# Patient Record
Sex: FEMALE | Race: WHITE | HISPANIC OR LATINO | Employment: FULL TIME | URBAN - METROPOLITAN AREA
[De-identification: names, ages, dates, MRNs, and addresses within clinical notes are randomized per-mention and may not be internally consistent; named-entity substitution may affect disease eponyms.]

---

## 2017-03-06 ENCOUNTER — OFFICE VISIT (OUTPATIENT)
Dept: FAMILY MEDICINE | Facility: CLINIC | Age: 37
End: 2017-03-06
Payer: COMMERCIAL

## 2017-03-06 VITALS
OXYGEN SATURATION: 98 % | WEIGHT: 175.5 LBS | BODY MASS INDEX: 31.1 KG/M2 | HEART RATE: 81 BPM | SYSTOLIC BLOOD PRESSURE: 122 MMHG | DIASTOLIC BLOOD PRESSURE: 72 MMHG | TEMPERATURE: 98.4 F | HEIGHT: 63 IN

## 2017-03-06 DIAGNOSIS — Z00.00 ROUTINE GENERAL MEDICAL EXAMINATION AT A HEALTH CARE FACILITY: Primary | ICD-10-CM

## 2017-03-06 DIAGNOSIS — Z13.6 CARDIOVASCULAR SCREENING; LDL GOAL LESS THAN 160: ICD-10-CM

## 2017-03-06 LAB
ANION GAP SERPL CALCULATED.3IONS-SCNC: 5 MMOL/L (ref 3–14)
BUN SERPL-MCNC: 15 MG/DL (ref 7–30)
CALCIUM SERPL-MCNC: 8.4 MG/DL (ref 8.5–10.1)
CHLORIDE SERPL-SCNC: 107 MMOL/L (ref 94–109)
CHOLEST SERPL-MCNC: 142 MG/DL
CO2 SERPL-SCNC: 27 MMOL/L (ref 20–32)
CREAT SERPL-MCNC: 0.72 MG/DL (ref 0.52–1.04)
ERYTHROCYTE [DISTWIDTH] IN BLOOD BY AUTOMATED COUNT: 16 % (ref 10–15)
GFR SERPL CREATININE-BSD FRML MDRD: ABNORMAL ML/MIN/1.7M2
GLUCOSE SERPL-MCNC: 94 MG/DL (ref 70–99)
HCT VFR BLD AUTO: 34.2 % (ref 35–47)
HDLC SERPL-MCNC: 45 MG/DL
HGB BLD-MCNC: 10.9 G/DL (ref 11.7–15.7)
LDLC SERPL CALC-MCNC: 84 MG/DL
MCH RBC QN AUTO: 23.3 PG (ref 26.5–33)
MCHC RBC AUTO-ENTMCNC: 31.9 G/DL (ref 31.5–36.5)
MCV RBC AUTO: 73 FL (ref 78–100)
NONHDLC SERPL-MCNC: 97 MG/DL
PLATELET # BLD AUTO: 306 10E9/L (ref 150–450)
POTASSIUM SERPL-SCNC: 4.3 MMOL/L (ref 3.4–5.3)
RBC # BLD AUTO: 4.68 10E12/L (ref 3.8–5.2)
SODIUM SERPL-SCNC: 139 MMOL/L (ref 133–144)
TRIGL SERPL-MCNC: 66 MG/DL
WBC # BLD AUTO: 7.9 10E9/L (ref 4–11)

## 2017-03-06 PROCEDURE — 99395 PREV VISIT EST AGE 18-39: CPT | Performed by: FAMILY MEDICINE

## 2017-03-06 PROCEDURE — 80061 LIPID PANEL: CPT | Performed by: FAMILY MEDICINE

## 2017-03-06 PROCEDURE — 85027 COMPLETE CBC AUTOMATED: CPT | Performed by: FAMILY MEDICINE

## 2017-03-06 PROCEDURE — 80048 BASIC METABOLIC PNL TOTAL CA: CPT | Performed by: FAMILY MEDICINE

## 2017-03-06 PROCEDURE — 36415 COLL VENOUS BLD VENIPUNCTURE: CPT | Performed by: FAMILY MEDICINE

## 2017-03-06 NOTE — NURSING NOTE
"Chief Complaint   Patient presents with     Physical       Initial /72  Pulse 81  Temp 98.4  F (36.9  C) (Oral)  Ht 5' 3\" (1.6 m)  Wt 175 lb 8 oz (79.6 kg)  SpO2 98%  BMI 31.09 kg/m2 Estimated body mass index is 31.09 kg/(m^2) as calculated from the following:    Height as of this encounter: 5' 3\" (1.6 m).    Weight as of this encounter: 175 lb 8 oz (79.6 kg).  Medication Reconciliation: complete   Nadege Taylor Medical Assistant      "

## 2017-03-06 NOTE — MR AVS SNAPSHOT
After Visit Summary   3/6/2017    Keiko Farah    MRN: [id number]           Patient Information     Date Of Birth          1980        Visit Information        Provider Department      3/6/2017 9:00 AM Weiler, Karen, MD [facility] [place]        Today's Diagnoses     Routine general medical examination at a health care facility    -  1    CARDIOVASCULAR SCREENING; LDL GOAL LESS THAN 160          Care Instructions      Preventive Health Recommendations  Female Ages 26 - 39  Yearly exam:   See your health care provider every year in order to    Review health changes.     Discuss preventive care.      Review your medicines if you your doctor has prescribed any.    Until age 30: Get a Pap test every three years (more often if you have had an abnormal result).    After age 30: Talk to your doctor about whether you should have a Pap test every 3 years or have a Pap test with HPV screening every 5 years.   You do not need a Pap test if your uterus was removed (hysterectomy) and you have not had cancer.  You should be tested each year for STDs (sexually transmitted diseases), if you're at risk.   Talk to your provider about how often to have your cholesterol checked.  If you are at risk for diabetes, you should have a diabetes test (fasting glucose).  Shots: Get a flu shot each year. Get a tetanus shot every 10 years.   Nutrition:     Eat at least 5 servings of fruits and vegetables each day.    Eat whole-grain bread, whole-wheat pasta and brown rice instead of white grains and rice.    Talk to your provider about Calcium and Vitamin D.     Lifestyle    Exercise at least 150 minutes a week (30 minutes a day, 5 days of the week). This will help you control your weight and prevent disease.    Limit alcohol to one drink per day.    No smoking.     Wear sunscreen to prevent skin cancer.    See your dentist every six months for an exam and cleaning.          Follow-ups after your visit        Who to  "contact     If you have questions or need follow up information about today's clinic visit or your schedule please contact [facility] [place] directly at [phone].  Normal or non-critical lab and imaging results will be communicated to you by MyChart, letter or phone within 4 business days after the clinic has received the results. If you do not hear from us within 7 days, please contact the clinic through MyChart or phone. If you have a critical or abnormal lab result, we will notify you by phone as soon as possible.  Submit refill requests through [employer] or call your pharmacy and they will forward the refill request to us. Please allow 3 business days for your refill to be completed.          Additional Information About Your Visit        [employer] Information     [employer] gives you secure access to your electronic health record. If you see a primary care provider, you can also send messages to your care team and make appointments. If you have questions, please call your primary care clinic.  If you do not have a primary care provider, please call [phone] and they will assist you.        Care EveryWhere ID     This is your Care EveryWhere ID. This could be used by other organizations to access your [facility] medical records  [id number]        Your Vitals Were     Pulse Temperature Height Pulse Oximetry BMI (Body Mass Index)       81 98.4  F (36.9  C) (Oral) 5' 3\" (1.6 m) 98% 31.09 kg/m2        Blood Pressure from Last 3 Encounters:   03/06/17 122/72   02/08/16 98/72   11/25/15 114/76    Weight from Last 3 Encounters:   03/06/17 175 lb 8 oz (79.6 kg)   02/08/16 175 lb (79.4 kg)   11/25/15 175 lb (79.4 kg)              We Performed the Following     Basic metabolic panel     CBC with platelets     JUST IN CASE     Lipid panel reflex to direct LDL        Primary Care Provider Office Phone # Fax #    Karen Weiler, [phone]       [facility] [address]  [place] " [address]        Thank you!     Thank you for choosing [facility] [place]  for your care. Our goal is always to provide you with excellent care. Hearing back from our patients is one way we can continue to improve our services. Please take a few minutes to complete the written survey that you may receive in the mail after your visit with us. Thank you!             Your Updated Medication List - Protect others around you: Learn how to safely use, store and throw away your medicines at www.disposemymeds.org.          This list is accurate as of: 3/6/17 10:09 AM.  Always use your most recent med list.                   Brand Name Dispense Instructions for use    DRYSOL EX          MULTI-VITAMIN DAILY PO          UNKNOWN MED DOSAGE          valACYclovir 500 MG tablet    VALTREX    14 tablet    Take 1 tablet (500 mg) by mouth 2 times daily as needed
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
## 2017-05-09 ENCOUNTER — OFFICE VISIT (OUTPATIENT)
Dept: FAMILY MEDICINE | Facility: CLINIC | Age: 37
End: 2017-05-09
Payer: COMMERCIAL

## 2017-05-09 VITALS
HEART RATE: 81 BPM | DIASTOLIC BLOOD PRESSURE: 72 MMHG | BODY MASS INDEX: 31.01 KG/M2 | WEIGHT: 175 LBS | SYSTOLIC BLOOD PRESSURE: 122 MMHG | HEIGHT: 63 IN | OXYGEN SATURATION: 98 % | TEMPERATURE: 98.4 F

## 2017-05-09 DIAGNOSIS — F43.9 STRESS: Primary | ICD-10-CM

## 2017-05-09 PROCEDURE — 99213 OFFICE O/P EST LOW 20 MIN: CPT | Performed by: FAMILY MEDICINE

## 2017-05-09 NOTE — PROGRESS NOTES
SUBJECTIVE:                                                    Keiko Farah is a 37 year old female who presents to clinic today for the following health issues:      Pt is here to discuss FMLA.  She has 4 kids living at home with her, each of whom has a variety of chronic medical problems. Her son John has developed a food allergies as well as some recurrent warts on his nose. He is typically seen twice a month to care for these warts by a skin specialist. Her son Mario has been diagnosed with ADHD and is having increasingly violent outbursts at home. His behavior is beginning to affect multiple family members. Her foster child Jeet continues to have recurrent academic failures. Her daughter Brandi is also having some learning disabilities and needs to be evaluated for this further. All of these have combined to create a significant amount of stress for Keiko.  She is finding it difficult to focus at work and that she spending a significant amount of time away from her job as a  to attend the medical needs of her children. Her supervisor advised her to complete a FMLA leave to protect her employment.    She estimates she goes to the doctor for her kids about 4 times per month and typically these episodes take about 4 hours to complete ROM picking up her child at school or home, transporting them to the physician office for evaluation, and then transporting them back. She estimates that at least 6 months will be required to stabilize each child's chronic medical condition with reevaluation being needed at 6 months.         Problem list and histories reviewed & adjusted, as indicated.  Additional history: as documented        Reviewed and updated as needed this visit by clinical staff       Reviewed and updated as needed this visit by Provider         ROS:  Constitutional, HEENT, cardiovascular, pulmonary, gi and gu systems are negative, except as otherwise noted.  Does note that this  "is putting some stress on her. Also notes her marriage feels a little bit strained as she is concerned about the alcohol consumption habits of her  Roberto who has a stressful job as an .    OBJECTIVE:                                                    /72  Pulse 81  Temp 98.4  F (36.9  C) (Tympanic)  Ht 5' 3\" (1.6 m)  Wt 175 lb (79.4 kg)  SpO2 98%  BMI 31 kg/m2  Body mass index is 31 kg/(m^2).  GENERAL: healthy, alert and no distress  PSYCH: mentation appears normal and tearful at times    Diagnostic Test Results:  none      ASSESSMENT/PLAN:                                                            1. Stress  Clearly she is under a significant amount of stress. I support the [facility] process for her in caring for her children with chronic medical problems. I will await receipt of the [facility] paperwork from her employer and fill it out as noted above.    Fifteen minutes were spent with the patient today, greater than 50% in face to face counseling.       See Patient Instructions    Eleazar Parry Jr, MD  [facility]  "

## 2017-05-09 NOTE — NURSING NOTE
"Chief Complaint   Patient presents with     Forms       Initial /72  Pulse 81  Temp 98.4  F (36.9  C) (Tympanic)  Ht 5' 3\" (1.6 m)  Wt 175 lb (79.4 kg)  SpO2 98%  BMI 31 kg/m2 Estimated body mass index is 31 kg/(m^2) as calculated from the following:    Height as of this encounter: 5' 3\" (1.6 m).    Weight as of this encounter: 175 lb (79.4 kg).  Medication Reconciliation: complete  "

## 2017-05-09 NOTE — MR AVS SNAPSHOT
"              After Visit Summary   5/9/2017    Keiko Farah    MRN: [id number]           Patient Information     Date Of Birth          1980        Visit Information        Provider Department      5/9/2017 10:00 AM Eleazar Parry Jr., MD [facility]        Today's Diagnoses     Stress    -  1       Follow-ups after your visit        Who to contact     If you have questions or need follow up information about today's clinic visit or your schedule please contact [facility] directly at [phone].  Normal or non-critical lab and imaging results will be communicated to you by MyChart, letter or phone within 4 business days after the clinic has received the results. If you do not hear from us within 7 days, please contact the clinic through [employer] or phone. If you have a critical or abnormal lab result, we will notify you by phone as soon as possible.  Submit refill requests through [employer] or call your pharmacy and they will forward the refill request to us. Please allow 3 business days for your refill to be completed.          Additional Information About Your Visit        MyChart Information     [employer] gives you secure access to your electronic health record. If you see a primary care provider, you can also send messages to your care team and make appointments. If you have questions, please call your primary care clinic.  If you do not have a primary care provider, please call [phone] and they will assist you.        Care EveryWhere ID     This is your Care EveryWhere ID. This could be used by other organizations to access your [facility] medical records  [id number]        Your Vitals Were     Pulse Temperature Height Pulse Oximetry BMI (Body Mass Index)       81 98.4  F (36.9  C) (Tympanic) 5' 3\" (1.6 m) 98% 31 kg/m2        Blood Pressure from Last 3 Encounters:   05/09/17 122/72   03/06/17 122/72   02/08/16 98/72    Weight from Last 3 Encounters:   05/09/17 175 lb (79.4 " kg)   03/06/17 175 lb 8 oz (79.6 kg)   02/08/16 175 lb (79.4 kg)              Today, you had the following     No orders found for display       Primary Care Provider Office Phone # Fax #    Karen Weiler, [phone]       [facility] [address]  [place] [address]        Thank you!     Thank you for choosing [facility]  for your care. Our goal is always to provide you with excellent care. Hearing back from our patients is one way we can continue to improve our services. Please take a few minutes to complete the written survey that you may receive in the mail after your visit with us. Thank you!             Your Updated Medication List - Protect others around you: Learn how to safely use, store and throw away your medicines at www.disposemymeds.org.          This list is accurate as of: 5/9/17  1:27 PM.  Always use your most recent med list.                   Brand Name Dispense Instructions for use    DRYSOL EX          MULTI-VITAMIN DAILY PO          UNKNOWN MED DOSAGE          valACYclovir 500 MG tablet    VALTREX    14 tablet    Take 1 tablet (500 mg) by mouth 2 times daily as needed

## 2017-05-09 NOTE — LETTER
[facility]  [address] [address] [phone]          May 9, 2017    RE:  Keiko Farah                                                                                                                                                       [address] [address]            To whom it may concern:    Keiko Farah is under my professional care for family related stressors.  She  may return to work with the following: The employee is ABLE to return to work today.        Sincerely,        Eleazar Parry Jr, MD

## 2017-06-28 ENCOUNTER — OFFICE VISIT (OUTPATIENT)
Dept: FAMILY MEDICINE | Facility: CLINIC | Age: 37
End: 2017-06-28
Payer: COMMERCIAL

## 2017-06-28 VITALS
HEIGHT: 63 IN | TEMPERATURE: 98.4 F | SYSTOLIC BLOOD PRESSURE: 114 MMHG | HEART RATE: 83 BPM | BODY MASS INDEX: 31.01 KG/M2 | WEIGHT: 175 LBS | OXYGEN SATURATION: 98 % | DIASTOLIC BLOOD PRESSURE: 70 MMHG

## 2017-06-28 DIAGNOSIS — H92.01 OTALGIA OF RIGHT EAR: Primary | ICD-10-CM

## 2017-06-28 PROCEDURE — 99213 OFFICE O/P EST LOW 20 MIN: CPT | Performed by: FAMILY MEDICINE

## 2017-06-28 NOTE — PROGRESS NOTES
"  SUBJECTIVE:                                                    Keiko Farah is a 37 year old female who presents to clinic today for the following health issues:      Acute Illness   Acute illness concerns:  Ear issue   Onset: 4 days    Fever: no    Chills/Sweats: no    Headache (location?): no    Sinus Pressure:YES    Conjunctivitis:  no    Ear Pain: YES- rt feels full    Rhinorrhea: YES    Congestion: YES    Sore Throat: YES- last wk     Cough: no    Wheeze: no    Decreased Appetite: no    Nausea: no    Vomiting: no    Diarrhea:  no    Dysuria/Freq.: no    Fatigue/Achiness: no    Sick/Strep Exposure: no     Therapies Tried and outcome:  peroxide           Problem list and histories reviewed & adjusted, as indicated.  Additional history: as documented        Reviewed and updated as needed this visit by clinical staff  Allergies  Meds  Med Hx       Reviewed and updated as needed this visit by Provider         ROS:  Constitutional, HEENT, cardiovascular, pulmonary, gi and gu systems are negative, except as otherwise noted.    OBJECTIVE:     /70  Pulse 83  Temp 98.4  F (36.9  C) (Oral)  Ht 5' 3\" (1.6 m)  Wt 175 lb (79.4 kg)  SpO2 98%  BMI 31 kg/m2  Body mass index is 31 kg/(m^2).  GENERAL: healthy, alert and no distress  EYES: Eyes grossly normal to inspection, PERRL and conjunctivae and sclerae normal  HENT: ear canals and TM's normal, nose and mouth without ulcers or lesions  NECK: no adenopathy, no asymmetry, masses, or scars and thyroid normal to palpation  RESP: lungs clear to auscultation - no rales, rhonchi or wheezes  CV: regular rate and rhythm, normal S1 S2, no S3 or S4, no murmur, click or rub, no peripheral edema and peripheral pulses strong  MS: no gross musculoskeletal defects noted, no edema  PSYCH: mentation appears normal, affect normal/bright    Diagnostic Test Results:  none     ASSESSMENT/PLAN:             1. Otalgia of right ear  Likely due to eustachian tube dysfunction.  " Encouraged Valsalva maneuver, Sudafed.  Return to clinic in 10-14 days if not improving, sooner if worsens.       See Patient Instructions    Eleazar Parry Jr, MD  [facility]

## 2017-06-28 NOTE — PATIENT INSTRUCTIONS
Endometrial ablation  Dr. Darin Sinha at [facility]  ([facility])

## 2017-06-28 NOTE — MR AVS SNAPSHOT
After Visit Summary   6/28/2017    Keiko Farah    MRN: [id number]           Patient Information     Date Of Birth          1980        Visit Information        Provider Department      6/28/2017 2:20 PM Eleazar Parry Jr., MD [facility]        Today's Diagnoses     Otalgia of right ear    -  1      Care Instructions    Endometrial ablation  Dr. Darin Sinha at [facility]  ([facility])          Follow-ups after your visit        Follow-up notes from your care team     Return if symptoms worsen or fail to improve.      Who to contact     If you have questions or need follow up information about today's clinic visit or your schedule please contact [facility] [place] directly at [phone].  Normal or non-critical lab and imaging results will be communicated to you by [employer], letter or phone within 4 business days after the clinic has received the results. If you do not hear from us within 7 days, please contact the clinic through [employer] or phone. If you have a critical or abnormal lab result, we will notify you by phone as soon as possible.  Submit refill requests through [employer] or call your pharmacy and they will forward the refill request to us. Please allow 3 business days for your refill to be completed.          Additional Information About Your Visit        MyChart Information     [employer] gives you secure access to your electronic health record. If you see a primary care provider, you can also send messages to your care team and make appointments. If you have questions, please call your primary care clinic.  If you do not have a primary care provider, please call [phone] and they will assist you.        Care EveryWhere ID     This is your Care EveryWhere ID. This could be used by other organizations to access your [facility] medical records  [id number]        Your Vitals Were     Pulse Temperature Height Pulse Oximetry BMI (Body Mass  "Index)       83 98.4  F (36.9  C) (Oral) 5' 3\" (1.6 m) 98% 31 kg/m2        Blood Pressure from Last 3 Encounters:   06/28/17 114/70   05/09/17 122/72   03/06/17 122/72    Weight from Last 3 Encounters:   06/28/17 175 lb (79.4 kg)   05/09/17 175 lb (79.4 kg)   03/06/17 175 lb 8 oz (79.6 kg)              Today, you had the following     No orders found for display       Primary Care Provider Office Phone # Fax #    Karen Weiler, [phone]       [facility] [address]  [place] [address]        Equal Access to Services     TOM HERNANDEZ : Hadii aad ku hadasho Sonavjotali, waaxda luqadaha, qaybta kaalmada adeegyada, waxay vijayin verónica hall. So [facility] [phone].    ATENCIÓN: Si habla español, tiene a oakley disposición servicios gratuitos de asistencia lingüística. Llame al [phone].    We comply with applicable federal civil rights laws and [place] laws. We do not discriminate on the basis of race, color, national origin, age, disability sex, sexual orientation or gender identity.            Thank you!     Thank you for choosing [facility]  for your care. Our goal is always to provide you with excellent care. Hearing back from our patients is one way we can continue to improve our services. Please take a few minutes to complete the written survey that you may receive in the mail after your visit with us. Thank you!             Your Updated Medication List - Protect others around you: Learn how to safely use, store and throw away your medicines at www.disposemymeds.org.          This list is accurate as of: 6/28/17  2:51 PM.  Always use your most recent med list.                   Brand Name Dispense Instructions for use Diagnosis    DRYSOL EX           MULTI-VITAMIN DAILY PO           UNKNOWN MED DOSAGE           valACYclovir 500 MG tablet    VALTREX    14 tablet    Take 1 tablet (500 mg) by mouth 2 times daily as needed    Recurrent cold sores         "

## 2017-06-28 NOTE — NURSING NOTE
"Chief Complaint   Patient presents with     Ear Problem       Initial /70  Pulse 83  Temp 98.4  F (36.9  C) (Oral)  Ht 5' 3\" (1.6 m)  Wt 175 lb (79.4 kg)  SpO2 98%  BMI 31 kg/m2 Estimated body mass index is 31 kg/(m^2) as calculated from the following:    Height as of this encounter: 5' 3\" (1.6 m).    Weight as of this encounter: 175 lb (79.4 kg).  Medication Reconciliation: complete  "

## 2018-02-13 ENCOUNTER — OFFICE VISIT (OUTPATIENT)
Dept: OBGYN | Facility: CLINIC | Age: 38
End: 2018-02-13
Payer: COMMERCIAL

## 2018-02-13 VITALS
BODY MASS INDEX: 32.78 KG/M2 | DIASTOLIC BLOOD PRESSURE: 72 MMHG | HEART RATE: 95 BPM | SYSTOLIC BLOOD PRESSURE: 116 MMHG | HEIGHT: 63 IN | WEIGHT: 185 LBS

## 2018-02-13 DIAGNOSIS — Z11.51 SCREENING FOR HUMAN PAPILLOMAVIRUS: ICD-10-CM

## 2018-02-13 DIAGNOSIS — Z01.419 WOMEN'S ANNUAL ROUTINE GYNECOLOGICAL EXAMINATION: Primary | ICD-10-CM

## 2018-02-13 DIAGNOSIS — N92.0 MENORRHAGIA WITH REGULAR CYCLE: ICD-10-CM

## 2018-02-13 DIAGNOSIS — R53.83 FATIGUE, UNSPECIFIED TYPE: ICD-10-CM

## 2018-02-13 DIAGNOSIS — Z12.4 PAP SMEAR FOR CERVICAL CANCER SCREENING: ICD-10-CM

## 2018-02-13 LAB
DEPRECATED CALCIDIOL+CALCIFEROL SERPL-MC: 16 UG/L (ref 20–75)
ERYTHROCYTE [DISTWIDTH] IN BLOOD BY AUTOMATED COUNT: 16.5 % (ref 10–15)
HCT VFR BLD AUTO: 34.6 % (ref 35–47)
HGB BLD-MCNC: 10.8 G/DL (ref 11.7–15.7)
MCH RBC QN AUTO: 22.5 PG (ref 26.5–33)
MCHC RBC AUTO-ENTMCNC: 31.2 G/DL (ref 31.5–36.5)
MCV RBC AUTO: 72 FL (ref 78–100)
PLATELET # BLD AUTO: 346 10E9/L (ref 150–450)
RBC # BLD AUTO: 4.81 10E12/L (ref 3.8–5.2)
TSH SERPL DL<=0.005 MIU/L-ACNC: 0.81 MU/L (ref 0.4–4)
WBC # BLD AUTO: 7.8 10E9/L (ref 4–11)

## 2018-02-13 PROCEDURE — 99385 PREV VISIT NEW AGE 18-39: CPT | Performed by: ADVANCED PRACTICE MIDWIFE

## 2018-02-13 PROCEDURE — 36415 COLL VENOUS BLD VENIPUNCTURE: CPT | Performed by: ADVANCED PRACTICE MIDWIFE

## 2018-02-13 PROCEDURE — 85027 COMPLETE CBC AUTOMATED: CPT | Performed by: ADVANCED PRACTICE MIDWIFE

## 2018-02-13 PROCEDURE — 82306 VITAMIN D 25 HYDROXY: CPT | Performed by: ADVANCED PRACTICE MIDWIFE

## 2018-02-13 PROCEDURE — G0145 SCR C/V CYTO,THINLAYER,RESCR: HCPCS | Performed by: ADVANCED PRACTICE MIDWIFE

## 2018-02-13 PROCEDURE — 87624 HPV HI-RISK TYP POOLED RSLT: CPT | Performed by: ADVANCED PRACTICE MIDWIFE

## 2018-02-13 PROCEDURE — 84443 ASSAY THYROID STIM HORMONE: CPT | Performed by: ADVANCED PRACTICE MIDWIFE

## 2018-02-13 RX ORDER — DESOGESTREL AND ETHINYL ESTRADIOL 0.15-0.03
1 KIT ORAL DAILY
Qty: 28 TABLET | Refills: 2 | Status: SHIPPED | OUTPATIENT
Start: 2018-02-13 | End: 2018-06-18
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
## 2018-02-13 NOTE — PATIENT INSTRUCTIONS
Birth Control Pills    Combination birth control pills contain both estrogen and progestin.  There are numerous brands of birth control pills otherwise known as oral contraceptive pills (OCP's).  Each brand has a different combination of estrogen and progestin so every woman can find the one that is right for her.  OCP's are a safe and effective way to prevent pregnancy in most women.    How do OCP's work  OCP's work by several different mechanisms.  They cause changes in the cervix and the lining of the uterus.  The cervical mucus becomes thicker which will prevent the sperm from entering the cervix.  The lining of the uterus becomes thin which helps prevent an egg from attaching to it.  In combination, these events make it unlikely that you will get pregnant. It may also prevent ovulation completely.    Benefits of OCP's  May reduce your risk of:  Cancer of the uterus and ovary, ovarian cysts, pelvic infection, bone loss, benign breast disease, anemia, ectopic pregnancy and acne.  It may also decrease symptoms of PCOS (Polycystic Ovarian Syndrome). OCP's may also improve cramping during menstrual cycle and may make you cycle shorter and lighter.    How to take OCP's  You have several choices on how to start taking your OCP's:    You can start the pill on the first day of your next period    You can start the pill on the Sunday after your next period starts    You can start the pill on the first day it was prescribed no matter where you are in your cycle.  In this case, you will need to make sure you are not pregnant.    No matter when you start your first pack, you will always start your next pack on the same day you started your first pack.    You should take the pill at the same time every day.  Do not skip any pills.  If you miss any pills, are taking antibiotics or vomit, use a backup method of birth control until you get your next period.    Pills come in packs of 21, 28 or 91 pills:      21 Pills:  Take one  pill at the same time every day for 21 days.  Wait 7 days before beginning your next pack.  During these 7 days you will have your period.    28 Pills:  Take one pill at the same time every day for 28 days.  The last 7 pills in the pack do not contain estrogen/progestin.  During these 7 days you will have your period.      91 Pills:  Take one pill at the same time every day for 91 days.  The last 7 pills in the pack do not contain estrogen/progestin.  During these 7 days you will have your period.  With this method you will only have 4 periods a year.  Some women eventually have no bleeding at all.    Each pill pack comes with instructions.  Please make sure you read them and understand these instructions.      What to do if you miss a pill    Occasionally you may forget to take a pill or not take it on time.  Take the missed pill as soon as you remember.  Take the next pill at the regular time.  It is ok if you take two pills in one day.  You may feel a bit queasy or have some spotting, this is normal and should not be concerning.  If you have missed more than one pill use a back up method of birth control and call the clinic for instructions on how to proceed.    Who should not take Combined OCP's    If you have a history or have blood clots    A history of cerebral vascular accident (stroke)    If you have ischemic heart or coronary artery disease    Known of suspected breast cancer    Known or suspected pregnancy    Smoker and over age 35    Any know liver abnormality    Migraine headaches with an aura    Undiagnosed abnormal vaginal bleeding    High blood pressure    Common side effects when starting OCP's  Headache, nausea, dizziness, breakthrough bleeding, missed periods, tender breasts, depression and anxiety.  Most side effects are minor and resolve in the first few months. Take the pill with meals or at bedtime if nausea occurs.    Call or return for care in the following circumstances:      Unexpected  missed periods or very heavy bleeding    Persistent vaginal bleeding    Depression    Suspected pregnancy    Persistent side effects such as:  Nausea, irregular menses or mood changes.    Seek emergency care immediately for the following:  ACHES    Abdominal or pelvic pain    Chest pain    Severe headache     Visual disturbances    Severe leg pain or numbness or tingling of extremities    Lastly-    Use of a backup method is recommended for the first cycle    Condoms are recommended to protect against STI's    OCP's are 99% effective if take correctly.    The pill helps to keep your periods regular, lighter and shorter and reduces cramps.  If you desire a pregnancy, you may stop taking your OCPs.     Please call the clinic with questions and concerns  [facility]  [phone]    Preventive Health Recommendations  Female Ages 21 - 39  Yearly exam:   See your health care provider every year in order to  1. Review health changes.  2. Discuss preventive care.    3. Review your medicines if you your provider has prescribed any.      You do not need a Pap test if your uterus was removed (hysterectomy) and you have not had cancer.    You should be tested each year for STIs (sexually transmitted diseases) if you're at risk.     Talk to your provider about how often to have your cholesterol checked, about every 5 years if normal.    If you are at risk for diabetes, you should have a diabetes test (fasting glucose).    Vitamin D deficiency screening and thyroid disease screening is also recommended every 3-5 years depending on risk factors or more often if symptomatic  PAP Smear:   Until age 30: Get a Pap test every three years (more often if you have had an abnormal result)    After age 30: Talk to your provider about whether you should have a Pap test every 3 years or have a Pap test with HPV screening every 5 years.   Shots: Get a flu shot each year. Get a tetanus shot every 10 years.   Nutrition:     Eat at least 5  servings of fruits and vegetables each day    Eat REAL food, stay away from processed foods.    Eat whole-grain bread, whole-wheat pasta and brown rice instead of white grains and rice.    For bone health:  Eat calcium-rich foods or take calcium pills (500 to 600 mg) twice a day with food (1200 mg per day). Also take vitamin D (2000 IUs) each day.     Lifestyle    Exercise regularly (30 minutes a day, 5 days of the week). This will help you control your weight and prevent disease.    Weight bearing exercise such as weight lifting, walking, running and yoga will be beneficial later in life preventing osteoporosis     Limit alcohol to one drink per day.    No smoking.     Wear sunscreen to prevent skin cancer.    See your dentist every six months to one year for an exam and cleaning depending on their recommendation    Get an eye exam every two years or more often if you wear glasses or contacts.

## 2018-02-13 NOTE — MR AVS SNAPSHOT
After Visit Summary   2/13/2018    Keiko Farah    MRN: [id number]           Patient Information     Date Of Birth          1980        Visit Information        Provider Department      2/13/2018 9:30 AM Jihan James CNM [facility] [place]        Today's Diagnoses     Women's annual routine gynecological examination    -  1    Pap smear for cervical cancer screening        Screening for human papillomavirus        Fatigue, unspecified type        Menorrhagia with regular cycle          Care Instructions    Birth Control Pills    Combination birth control pills contain both estrogen and progestin.  There are numerous brands of birth control pills otherwise known as oral contraceptive pills (OCP's).  Each brand has a different combination of estrogen and progestin so every woman can find the one that is right for her.  OCP's are a safe and effective way to prevent pregnancy in most women.    How do OCP's work  OCP's work by several different mechanisms.  They cause changes in the cervix and the lining of the uterus.  The cervical mucus becomes thicker which will prevent the sperm from entering the cervix.  The lining of the uterus becomes thin which helps prevent an egg from attaching to it.  In combination, these events make it unlikely that you will get pregnant. It may also prevent ovulation completely.    Benefits of OCP's  May reduce your risk of:  Cancer of the uterus and ovary, ovarian cysts, pelvic infection, bone loss, benign breast disease, anemia, ectopic pregnancy and acne.  It may also decrease symptoms of PCOS (Polycystic Ovarian Syndrome). OCP's may also improve cramping during menstrual cycle and may make you cycle shorter and lighter.    How to take OCP's  You have several choices on how to start taking your OCP's:    You can start the pill on the first day of your next period    You can start the pill on the Sunday after your next period starts    You can start the pill  on the first day it was prescribed no matter where you are in your cycle.  In this case, you will need to make sure you are not pregnant.    No matter when you start your first pack, you will always start your next pack on the same day you started your first pack.    You should take the pill at the same time every day.  Do not skip any pills.  If you miss any pills, are taking antibiotics or vomit, use a backup method of birth control until you get your next period.    Pills come in packs of 21, 28 or 91 pills:      21 Pills:  Take one pill at the same time every day for 21 days.  Wait 7 days before beginning your next pack.  During these 7 days you will have your period.    28 Pills:  Take one pill at the same time every day for 28 days.  The last 7 pills in the pack do not contain estrogen/progestin.  During these 7 days you will have your period.      91 Pills:  Take one pill at the same time every day for 91 days.  The last 7 pills in the pack do not contain estrogen/progestin.  During these 7 days you will have your period.  With this method you will only have 4 periods a year.  Some women eventually have no bleeding at all.    Each pill pack comes with instructions.  Please make sure you read them and understand these instructions.      What to do if you miss a pill    Occasionally you may forget to take a pill or not take it on time.  Take the missed pill as soon as you remember.  Take the next pill at the regular time.  It is ok if you take two pills in one day.  You may feel a bit queasy or have some spotting, this is normal and should not be concerning.  If you have missed more than one pill use a back up method of birth control and call the clinic for instructions on how to proceed.    Who should not take Combined OCP's    If you have a history or have blood clots    A history of cerebral vascular accident (stroke)    If you have ischemic heart or coronary artery disease    Known of suspected breast  cancer    Known or suspected pregnancy    Smoker and over age 35    Any know liver abnormality    Migraine headaches with an aura    Undiagnosed abnormal vaginal bleeding    High blood pressure    Common side effects when starting OCP's  Headache, nausea, dizziness, breakthrough bleeding, missed periods, tender breasts, depression and anxiety.  Most side effects are minor and resolve in the first few months. Take the pill with meals or at bedtime if nausea occurs.    Call or return for care in the following circumstances:      Unexpected missed periods or very heavy bleeding    Persistent vaginal bleeding    Depression    Suspected pregnancy    Persistent side effects such as:  Nausea, irregular menses or mood changes.    Seek emergency care immediately for the following:  ACHES    Abdominal or pelvic pain    Chest pain    Severe headache     Visual disturbances    Severe leg pain or numbness or tingling of extremities    Lastly-    Use of a backup method is recommended for the first cycle    Condoms are recommended to protect against STI's    OCP's are 99% effective if take correctly.    The pill helps to keep your periods regular, lighter and shorter and reduces cramps.  If you desire a pregnancy, you may stop taking your OCPs.     Please call the clinic with questions and concerns  [facility]  [phone]    Preventive Health Recommendations  Female Ages 21 - 39  Yearly exam:   See your health care provider every year in order to  1. Review health changes.  2. Discuss preventive care.    3. Review your medicines if you your provider has prescribed any.      You do not need a Pap test if your uterus was removed (hysterectomy) and you have not had cancer.    You should be tested each year for STIs (sexually transmitted diseases) if you're at risk.     Talk to your provider about how often to have your cholesterol checked, about every 5 years if normal.    If you are at risk for diabetes, you should have a  diabetes test (fasting glucose).    Vitamin D deficiency screening and thyroid disease screening is also recommended every 3-5 years depending on risk factors or more often if symptomatic  PAP Smear:   Until age 30: Get a Pap test every three years (more often if you have had an abnormal result)    After age 30: Talk to your provider about whether you should have a Pap test every 3 years or have a Pap test with HPV screening every 5 years.   Shots: Get a flu shot each year. Get a tetanus shot every 10 years.   Nutrition:     Eat at least 5 servings of fruits and vegetables each day    Eat REAL food, stay away from processed foods.    Eat whole-grain bread, whole-wheat pasta and brown rice instead of white grains and rice.    For bone health:  Eat calcium-rich foods or take calcium pills (500 to 600 mg) twice a day with food (1200 mg per day). Also take vitamin D (2000 IUs) each day.     Lifestyle    Exercise regularly (30 minutes a day, 5 days of the week). This will help you control your weight and prevent disease.    Weight bearing exercise such as weight lifting, walking, running and yoga will be beneficial later in life preventing osteoporosis     Limit alcohol to one drink per day.    No smoking.     Wear sunscreen to prevent skin cancer.    See your dentist every six months to one year for an exam and cleaning depending on their recommendation    Get an eye exam every two years or more often if you wear glasses or contacts.                Follow-ups after your visit        Follow-up notes from your care team     Return in about 3 months (around 5/13/2018) for follow up on birth control pills.      Who to contact     If you have questions or need follow up information about today's clinic visit or your schedule please contact [facility] [place] directly at [phone].  Normal or non-critical lab and imaging results will be communicated to you by MyChart, letter or phone within 4 business days after the  "clinic has received the results. If you do not hear from us within 7 days, please contact the clinic through [employer] or phone. If you have a critical or abnormal lab result, we will notify you by phone as soon as possible.  Submit refill requests through [employer] or call your pharmacy and they will forward the refill request to us. Please allow 3 business days for your refill to be completed.          Additional Information About Your Visit        [employer] Information     [employer] gives you secure access to your electronic health record. If you see a primary care provider, you can also send messages to your care team and make appointments. If you have questions, please call your primary care clinic.  If you do not have a primary care provider, please call [phone] and they will assist you.        Care EveryWhere ID     This is your Care EveryWhere ID. This could be used by other organizations to access your [facility] medical records  [id number]        Your Vitals Were     Pulse Height Last Period Breastfeeding? BMI (Body Mass Index)       95 5' 3\" (1.6 m) 02/13/2018 (Exact Date) No 32.77 kg/m2        Blood Pressure from Last 3 Encounters:   02/13/18 116/72   06/28/17 114/70   05/09/17 122/72    Weight from Last 3 Encounters:   02/13/18 185 lb (83.9 kg)   06/28/17 175 lb (79.4 kg)   05/09/17 175 lb (79.4 kg)              We Performed the Following     CBC with platelets     HPV High Risk Types DNA Cervical     PAP imaged thin layer screen     TSH with free T4 reflex     Vitamin D Deficiency          Today's Medication Changes          These changes are accurate as of 2/13/18 10:37 AM.  If you have any questions, ask your nurse or doctor.               Start taking these medicines.        Dose/Directions    desogestrel-ethinyl estradiol 0.15-30 MG-MCG per tablet   Commonly known as:  APRI   Used for:  Menorrhagia with regular cycle   Started by:  Jihan James CNM        Dose:  1 tablet   Take 1 tablet by mouth " daily   Quantity:  28 tablet   Refills:  2            Where to get your medicines      These medications were sent to [facility] [address] [facility] [place], [place] - [address]  [address] [facility] [place] [address]     Phone:  [phone]     desogestrel-ethinyl estradiol 0.15-30 MG-MCG per tablet                Primary Care Provider Office Phone # Fax #    [facility] [phone] [address]  [facility] [place] [address]        Equal Access to Services     : Hadii aad ku hadasho Soomaali, waaxda luqadaha, qaybta kaalmada adeegyada, waxay vijayin hayollie gordillo . So [facility] [phone].    ATENCIÓN: Si habla español, tiene a oakley disposición servicios gratuitos de asistencia lingüística. [facility] [phone].    We comply with applicable federal civil rights laws and [place] laws. We do not discriminate on the basis of race, color, national origin, age, disability, sex, sexual orientation, or gender identity.            Thank you!     Thank you for choosing [facility]  for your care. Our goal is always to provide you with excellent care. Hearing back from our patients is one way we can continue to improve our services. Please take a few minutes to complete the written survey that you may receive in the mail after your visit with us. Thank you!             Your Updated Medication List - Protect others around you: Learn how to safely use, store and throw away your medicines at www.disposemymeds.org.          This list is accurate as of 2/13/18 10:37 AM.  Always use your most recent med list.                   Brand Name Dispense Instructions for use Diagnosis    desogestrel-ethinyl estradiol 0.15-30 MG-MCG per tablet    APRI    28 tablet    Take 1 tablet by mouth daily    Menorrhagia with regular cycle       DRYSOL EX           MULTI-VITAMIN DAILY PO           UNKNOWN MED DOSAGE           valACYclovir 500 MG tablet    VALTREX    14 tablet    Take 1 tablet  (500 mg) by mouth 2 times daily as needed    Recurrent cold sores

## 2018-02-13 NOTE — NURSING NOTE
"Chief Complaint   Patient presents with     Physical     Concerned about iron level, hx of anemia.  Frequent fatigue, increased weight.    Initial /72  Pulse 95  Ht 5' 3\" (1.6 m)  Wt 185 lb (83.9 kg)  LMP 02/13/2018 (Exact Date)  Breastfeeding? No  BMI 32.77 kg/m2 Estimated body mass index is 32.77 kg/(m^2) as calculated from the following:    Height as of this encounter: 5' 3\" (1.6 m).    Weight as of this encounter: 185 lb (83.9 kg).  Medication Reconciliation: complete     Angela Colunga CMA      "

## 2018-02-14 ENCOUNTER — TELEPHONE (OUTPATIENT)
Dept: OBGYN | Facility: CLINIC | Age: 38
End: 2018-02-14

## 2018-02-14 NOTE — TELEPHONE ENCOUNTER
Phone call to patient regarding lab results: vitamin D, hgb. Provided patient with education regarding dietary sources of vitamin D and iron. Advised patient to increase dairy (as she is able) and iron rich foods and can try using OTC oral supplements of Ferrous gluconate and vitamin D from drug store. Patient states that she started a calcium/vitamin D supplement yesterday. Informed patient to not take calcium and vitamin D at the same time as ferrous gluconate, as it will affect absorption rates. Patient verbalized understanding of plan and denied any further questions.    Charleen Gordon RN, PHN, SNM  Jihan James APRN, CNM

## 2018-02-15 LAB
COPATH REPORT: NORMAL
PAP: NORMAL

## 2018-02-16 LAB
FINAL DIAGNOSIS: NORMAL
HPV HR 12 DNA CVX QL NAA+PROBE: NEGATIVE
HPV16 DNA SPEC QL NAA+PROBE: NEGATIVE
HPV18 DNA SPEC QL NAA+PROBE: NEGATIVE
SPECIMEN DESCRIPTION: NORMAL
SPECIMEN SOURCE CVX/VAG CYTO: NORMAL

## 2018-03-22 ENCOUNTER — OFFICE VISIT (OUTPATIENT)
Dept: OBGYN | Facility: CLINIC | Age: 38
End: 2018-03-22
Payer: COMMERCIAL

## 2018-03-22 VITALS
DIASTOLIC BLOOD PRESSURE: 72 MMHG | HEIGHT: 63 IN | SYSTOLIC BLOOD PRESSURE: 116 MMHG | BODY MASS INDEX: 32.71 KG/M2 | WEIGHT: 184.6 LBS

## 2018-03-22 DIAGNOSIS — E55.9 VITAMIN D DEFICIENCY: Primary | ICD-10-CM

## 2018-03-22 DIAGNOSIS — N92.6 IRREGULAR MENSTRUAL CYCLE: ICD-10-CM

## 2018-03-22 PROCEDURE — 99213 OFFICE O/P EST LOW 20 MIN: CPT | Performed by: ADVANCED PRACTICE MIDWIFE

## 2018-03-22 RX ORDER — DESOGESTREL AND ETHINYL ESTRADIOL 0.15-0.03
1 KIT ORAL DAILY
Qty: 84 TABLET | Refills: 4 | Status: SHIPPED | OUTPATIENT
Start: 2018-03-22 | End: 2018-09-25

## 2018-03-22 NOTE — MR AVS SNAPSHOT
After Visit Summary   3/22/2018    Keiko Farah    MRN: [id number]           Patient Information     Date Of Birth          1980        Visit Information        Provider Department      3/22/2018 10:00 AM Jihan James CNM [facility] [place]        Today's Diagnoses     Vitamin D deficiency    -  1    Irregular menstrual cycle          Care Instructions    The use of the oral contraceptive pill has been fully discussed with the patient. This includes the proper method to initiate  and continue the pill, the need for regular compliance to ensure adequate contraceptive effect, the physiology which makes the pill effective, the instructions for what to do in event of a missed pill, and warnings about anticipated minor side effects such as breakthrough spotting, nausea, breast tenderness, weight changes, acne, headaches, etc. She was informed of the irregular bleeding pattern that can occur when the pill is first started or a new form is changed over for the first 2-3 months.  She has been told of the more serious potential side effects such as MI, stroke, and deep vein thrombosis, all of which are very unlikely.  She has been asked to report any signs of such serious problems immediately.   She understands and wishes to take the medication as prescribed.          Follow-ups after your visit        Follow-up notes from your care team     Return if symptoms worsen or fail to improve.      Who to contact     If you have questions or need follow up information about today's clinic visit or your schedule please contact [facility] [place] directly at [phone].  Normal or non-critical lab and imaging results will be communicated to you by MyChart, letter or phone within 4 business days after the clinic has received the results. If you do not hear from us within 7 days, please contact the clinic through MyChart or phone. If you have a critical or abnormal lab result, we will notify you by  "phone as soon as possible.  Submit refill requests through [employer] or call your pharmacy and they will forward the refill request to us. Please allow 3 business days for your refill to be completed.          Additional Information About Your Visit        [employer] Information     [employer] gives you secure access to your electronic health record. If you see a primary care provider, you can also send messages to your care team and make appointments. If you have questions, please call your primary care clinic.  If you do not have a primary care provider, please call [phone] and they will assist you.        Care EveryWhere ID     This is your Care EveryWhere ID. This could be used by other organizations to access your [facility] medical records  [id number]        Your Vitals Were     Height Last Period BMI (Body Mass Index)             5' 3\" (1.6 m) 02/13/2018 (Exact Date) 32.7 kg/m2          Blood Pressure from Last 3 Encounters:   03/22/18 116/72   02/13/18 116/72   06/28/17 114/70    Weight from Last 3 Encounters:   03/22/18 184 lb 9.6 oz (83.7 kg)   02/13/18 185 lb (83.9 kg)   06/28/17 175 lb (79.4 kg)              Today, you had the following     No orders found for display         Today's Medication Changes          These changes are accurate as of 3/22/18 11:34 AM.  If you have any questions, ask your nurse or doctor.               Start taking these medicines.        Dose/Directions    cholecalciferol 97205 UNITS capsule   Commonly known as:  VITAMIN D3   Used for:  Vitamin D deficiency   Started by:  Jihan James CNM        Dose:  1 capsule   Take 1 capsule (50,000 Units) by mouth once a week   Quantity:  10 capsule   Refills:  0         These medicines have changed or have updated prescriptions.        Dose/Directions    * desogestrel-ethinyl estradiol 0.15-30 MG-MCG per tablet   Commonly known as:  APRI   This may have changed:  Another medication with the same name was added. Make sure you understand " how and when to take each.   Used for:  Menorrhagia with regular cycle   Changed by:  Jihan James CNM        Dose:  1 tablet   Take 1 tablet by mouth daily   Quantity:  28 tablet   Refills:  2       * desogestrel-ethinyl estradiol 0.15-30 MG-MCG per tablet   Commonly known as:  APRI   This may have changed:  You were already taking a medication with the same name, and this prescription was added. Make sure you understand how and when to take each.   Used for:  Irregular menstrual cycle   Changed by:  Jihan James CNM        Dose:  1 tablet   Take 1 tablet by mouth daily   Quantity:  84 tablet   Refills:  4       * Notice:  This list has 2 medication(s) that are the same as other medications prescribed for you. Read the directions carefully, and ask your doctor or other care provider to review them with you.         Where to get your medicines      These [facility] [address] [place] [address]  [address] [facility] [address]     Phone:  [phone]     cholecalciferol 82965 UNITS capsule    desogestrel-ethinyl estradiol 0.15-30 MG-MCG per tablet                Primary Care Provider Office Phone # Fax #    [facility] [phone] [address]  [facility] [place] [place] [address]        Equal Access to Services     TOM HERNANDEZ AH: Hadii adebayo sharpe hadasho Soomaali, waaxda luqadaha, qaybta kaalmada adeegyada, farzaneh whitney hayollie hall. So [facility] [phone].    ATENCIÓN: Si habla español, tiene a oakley disposición servicios gratuitos de asistencia lingüística. [facility] [phone].    We comply with applicable federal civil rights laws and [place] laws. We do not discriminate on the basis of race, color, national origin, age, disability, sex, sexual orientation, or gender identity.            Thank you!     Thank you for choosing [facility]  for your care. Our goal is always to provide you with excellent care. Hearing  back from our patients is one way we can continue to improve our services. Please take a few minutes to complete the written survey that you may receive in the mail after your visit with us. Thank you!             Your Updated Medication List - Protect others around you: Learn how to safely use, store and throw away your medicines at www.disposemymeds.org.          This list is accurate as of 3/22/18 11:34 AM.  Always use your most recent med list.                   Brand Name Dispense Instructions for use Diagnosis    cholecalciferol 35905 UNITS capsule    VITAMIN D3    10 capsule    Take 1 capsule (50,000 Units) by mouth once a week    Vitamin D deficiency       * desogestrel-ethinyl estradiol 0.15-30 MG-MCG per tablet    APRI    28 tablet    Take 1 tablet by mouth daily    Menorrhagia with regular cycle       * desogestrel-ethinyl estradiol 0.15-30 MG-MCG per tablet    APRI    84 tablet    Take 1 tablet by mouth daily    Irregular menstrual cycle       DRYSOL EX           MULTI-VITAMIN DAILY PO           UNKNOWN MED DOSAGE           valACYclovir 500 MG tablet    VALTREX    14 tablet    Take 1 tablet (500 mg) by mouth 2 times daily as needed    Recurrent cold sores       * Notice:  This list has 2 medication(s) that are the same as other medications prescribed for you. Read the directions carefully, and ask your doctor or other care provider to review them with you.

## 2018-03-22 NOTE — NURSING NOTE
"Chief Complaint   Patient presents with     RECHECK       Initial /72  Ht 5' 3\" (1.6 m)  Wt 184 lb 9.6 oz (83.7 kg)  LMP 02/13/2018 (Exact Date)  BMI 32.7 kg/m2 Estimated body mass index is 32.7 kg/(m^2) as calculated from the following:    Height as of this encounter: 5' 3\" (1.6 m).    Weight as of this encounter: 184 lb 9.6 oz (83.7 kg).  Medication Reconciliation: complete     Angela Colunga CMA      "

## 2018-03-22 NOTE — PROGRESS NOTES
Follow up visit for DUB    Subjective:  HPI: Patient presents to clinic for follow up to 2/13/18 visit. Labs drawn on 2/13/18 showed decreased hgb and vitamin D levels. In addition, patient reported symptoms of heavy periods of long duration, 8-10 days and decreased libido. Reviewed all lab results and recommendations for treatment. Patient states that she has started taking APRI and is happy with not bleeding all the time. Patient also states that she feels better and her  has noticed a change in her as well. Patient was unable to take OTC calcium/vitamin D supplements due to distaste and declines to take iron supplement due to side effect of constipation.     Patient's last menstrual period was 02/13/2018 (exact date).    Days of bleeding:  Denies any bleeding  Frequency of Bleeding:  N/A  Characteristics Bleeding:  N/A  Number of pad/tampons per day:  N/A  Contraception:  tubal ligation  Possibility of pregnancy:  No    Medication:      Current Outpatient Prescriptions:      cholecalciferol (VITAMIN D3) 35786 UNITS capsule, Take 1 capsule (50,000 Units) by mouth once a week, Disp: 10 capsule, Rfl: 0     desogestrel-ethinyl estradiol (APRI) 0.15-30 MG-MCG per tablet, Take 1 tablet by mouth daily, Disp: 84 tablet, Rfl: 4     desogestrel-ethinyl estradiol (APRI) 0.15-30 MG-MCG per tablet, Take 1 tablet by mouth daily, Disp: 28 tablet, Rfl: 2     Aluminum Chloride (DRYSOL EX), , Disp: , Rfl:      valACYclovir (VALTREX) 500 MG tablet, Take 1 tablet (500 mg) by mouth 2 times daily as needed, Disp: 14 tablet, Rfl: 1     UNKNOWN MED DOSAGE, , Disp: , Rfl:      Multiple Vitamin (MULTI-VITAMIN DAILY PO), , Disp: , Rfl:     Past Medical History:   Diagnosis Date     History of cold sores      HPV in female 2008     Recurrent UTI      Past Surgical History:   Procedure Laterality Date     ABDOMEN SURGERY      3 Cesarian sections     GYN SURGERY  8/23/11    Tubal Ligation     GYN SURGERY  2009    Colposcopy     augustus  teeth[  1996     OBJECTIVE:  Exam:  Constitutional: healthy, alert and no distress  Respiratory: negative. Good diaphragmatic excursion. Respirations unlabored.  Psychiatric: mentation appears normal and affect normal/bright    ASSESSMENT/PLAN:     Diagnosis Comments   1. Vitamin D deficiency  cholecalciferol (VITAMIN D3) 42265 UNITS capsule    2. Irregular menstrual cycle  desogestrel-ethinyl estradiol (APRI) 0.15-30 MG-MCG per tablet        1. Rx for Vitamin D3 for low vitamin D level; advised patient to RTC in 8 weeks to recheck vitamin D level.  2. Rx for APRI for irregular menses, 1 yr script given. Discussed extended use (tricycling) with patient. Patient aware that she may experience some break through bleeding.    Next annual/gyn appointment 2/2019.    15 minutes was spent face to face with the patient today discussing her history, diagnosis, and follow-up plan as noted above. Over 50% of the visit was spent in counseling and coordination of care.      YUNIOR Westfall, CNM

## 2018-03-22 NOTE — PATIENT INSTRUCTIONS
The use of the oral contraceptive pill has been fully discussed with the patient. This includes the proper method to initiate  and continue the pill, the need for regular compliance to ensure adequate contraceptive effect, the physiology which makes the pill effective, the instructions for what to do in event of a missed pill, and warnings about anticipated minor side effects such as breakthrough spotting, nausea, breast tenderness, weight changes, acne, headaches, etc. She was informed of the irregular bleeding pattern that can occur when the pill is first started or a new form is changed over for the first 2-3 months.  She has been told of the more serious potential side effects such as MI, stroke, and deep vein thrombosis, all of which are very unlikely.  She has been asked to report any signs of such serious problems immediately.   She understands and wishes to take the medication as prescribed.

## 2018-05-31 DIAGNOSIS — E55.9 VITAMIN D DEFICIENCY: ICD-10-CM

## 2018-05-31 NOTE — TELEPHONE ENCOUNTER
Requested Prescriptions   Pending Prescriptions Disp Refills     cholecalciferol (VITAMIN D3) 44342 units capsule  Last Written Prescription Date:  03/22/2018  Last Fill Quantity: 10 capsule,  # refills: 0   Last office visit: 6/28/2017 with prescribing provider:  03/22/2018   Future Office Visit:     10 capsule 0     Sig: Take 1 capsule (50,000 Units) by mouth once a week    There is no refill protocol information for this order     Routing refill request to provider for review/approval because:  Drug not on the [facility], P or [facility] refill protocol or controlled substance

## 2018-06-01 NOTE — TELEPHONE ENCOUNTER
"Routing refill request to provider for review/approval because:  High dose Vitamin D.     Kimberlee Munson R.N.          Requested Prescriptions   Pending Prescriptions Disp Refills     cholecalciferol (VITAMIN D3) 37756 units capsule 10 capsule 0     Sig: Take 1 capsule (50,000 Units) by mouth once a week    Vitamin Supplements (Adult) Protocol Failed    5/31/2018 10:47 AM       Failed - High dose Vitamin D not ordered       Failed - Recent (12 mo) or future (30 days) visit within the authorizing provider's specialty    Patient had office visit in the last 12 months or has a visit in the next 30 days with authorizing provider or within the authorizing provider's specialty.  See \"Patient Info\" tab in inbasket, or \"Choose Columns\" in Meds & Orders section of the refill encounter.              "

## 2018-06-01 NOTE — TELEPHONE ENCOUNTER
Please call patient and advise that she needs to have a repeat lab draw before a refill. I need her current Vitamin D level to know which dose to prescribe. I have put a future order in the chart.      Thank you,    YUNIOR Westfall, JAGDISHM

## 2018-06-18 ENCOUNTER — OFFICE VISIT (OUTPATIENT)
Dept: OBGYN | Facility: CLINIC | Age: 38
End: 2018-06-18
Payer: COMMERCIAL

## 2018-06-18 VITALS — SYSTOLIC BLOOD PRESSURE: 122 MMHG | WEIGHT: 186 LBS | BODY MASS INDEX: 32.95 KG/M2 | DIASTOLIC BLOOD PRESSURE: 78 MMHG

## 2018-06-18 DIAGNOSIS — E55.9 VITAMIN D DEFICIENCY: ICD-10-CM

## 2018-06-18 DIAGNOSIS — R35.0 URINARY FREQUENCY: Primary | ICD-10-CM

## 2018-06-18 LAB
ALBUMIN SERPL-MCNC: 3.5 G/DL (ref 3.4–5)
ALBUMIN UR-MCNC: NEGATIVE MG/DL
ALP SERPL-CCNC: 56 U/L (ref 40–150)
ALT SERPL W P-5'-P-CCNC: 46 U/L (ref 0–50)
ANION GAP SERPL CALCULATED.3IONS-SCNC: 4 MMOL/L (ref 3–14)
APPEARANCE UR: CLEAR
AST SERPL W P-5'-P-CCNC: 21 U/L (ref 0–45)
BILIRUB SERPL-MCNC: 0.2 MG/DL (ref 0.2–1.3)
BILIRUB UR QL STRIP: NEGATIVE
BUN SERPL-MCNC: 14 MG/DL (ref 7–30)
CALCIUM SERPL-MCNC: 8.7 MG/DL (ref 8.5–10.1)
CHLORIDE SERPL-SCNC: 108 MMOL/L (ref 94–109)
CO2 SERPL-SCNC: 27 MMOL/L (ref 20–32)
COLOR UR AUTO: YELLOW
CREAT SERPL-MCNC: 0.64 MG/DL (ref 0.52–1.04)
GFR SERPL CREATININE-BSD FRML MDRD: >90 ML/MIN/1.7M2
GLUCOSE SERPL-MCNC: 91 MG/DL (ref 70–99)
GLUCOSE UR STRIP-MCNC: NEGATIVE MG/DL
HGB UR QL STRIP: NEGATIVE
KETONES UR STRIP-MCNC: NEGATIVE MG/DL
LEUKOCYTE ESTERASE UR QL STRIP: NEGATIVE
NITRATE UR QL: NEGATIVE
PH UR STRIP: 6 PH (ref 5–7)
POTASSIUM SERPL-SCNC: 4.2 MMOL/L (ref 3.4–5.3)
PROT SERPL-MCNC: 7.5 G/DL (ref 6.8–8.8)
SODIUM SERPL-SCNC: 139 MMOL/L (ref 133–144)
SOURCE: NORMAL
SP GR UR STRIP: 1.01 (ref 1–1.03)
UROBILINOGEN UR STRIP-ACNC: 0.2 EU/DL (ref 0.2–1)

## 2018-06-18 PROCEDURE — 82306 VITAMIN D 25 HYDROXY: CPT | Performed by: ADVANCED PRACTICE MIDWIFE

## 2018-06-18 PROCEDURE — 80053 COMPREHEN METABOLIC PANEL: CPT | Performed by: ADVANCED PRACTICE MIDWIFE

## 2018-06-18 PROCEDURE — 87086 URINE CULTURE/COLONY COUNT: CPT | Performed by: ADVANCED PRACTICE MIDWIFE

## 2018-06-18 PROCEDURE — 81003 URINALYSIS AUTO W/O SCOPE: CPT | Performed by: ADVANCED PRACTICE MIDWIFE

## 2018-06-18 PROCEDURE — 99213 OFFICE O/P EST LOW 20 MIN: CPT | Performed by: ADVANCED PRACTICE MIDWIFE

## 2018-06-18 PROCEDURE — 36415 COLL VENOUS BLD VENIPUNCTURE: CPT | Performed by: ADVANCED PRACTICE MIDWIFE

## 2018-06-18 NOTE — NURSING NOTE
"Chief Complaint   Patient presents with     RECHECK     Vitamin D       Initial /78  Wt 186 lb (84.4 kg)  LMP 2018 (Exact Date)  Breastfeeding? No  BMI 32.95 kg/m2 Estimated body mass index is 32.95 kg/(m^2) as calculated from the following:    Height as of 3/22/18: 5' 3\" (1.6 m).    Weight as of this encounter: 186 lb (84.4 kg).  BP completed using cuff size: regular        Angela Colunga CMA               "

## 2018-06-18 NOTE — MR AVS SNAPSHOT
After Visit Summary   6/18/2018    Keiko Farah    MRN: [id number]           Patient Information     Date Of Birth          1980        Visit Information        Provider Department      6/18/2018 11:15 AM Jihan James CNM [facility]        Today's Diagnoses     Urinary frequency    -  1    Vitamin D deficiency           Follow-ups after your visit        Follow-up notes from your care team     Return if symptoms worsen or fail to improve.      Who to contact     If you have questions or need follow up information about today's clinic visit or your schedule please contact [facility] directly at [phone].  Normal or non-critical lab and imaging results will be communicated to you by SimpleDealhart, letter or phone within 4 business days after the clinic has received the results. If you do not hear from us within 7 days, please contact the clinic through SimpleDealhart or phone. If you have a critical or abnormal lab result, we will notify you by phone as soon as possible.  Submit refill requests through PLUMgrid or call your pharmacy and they will forward the refill request to us. Please allow 3 business days for your refill to be completed.          Additional Information About Your Visit        MyChart Information     [employer]UM[employer] gives you secure access to your electronic health record. If you see a primary care provider, you can also send messages to your care team and make appointments. If you have questions, please call your primary care clinic.  If you do not have a primary care provider, please call [phone] and they will assist you.        Care EveryWhere ID     This is your Care EveryWhere ID. This could be used by other organizations to access your [facility] medical records  [id number]        Your Vitals Were     Last Period Breastfeeding? BMI (Body Mass Index)             05/23/2018 (Exact Date) No 32.95 kg/m2          Blood Pressure from Last 3 Encounters:    06/18/18 122/78   03/22/18 116/72   02/13/18 116/72    Weight from Last 3 Encounters:   06/18/18 186 lb (84.4 kg)   03/22/18 184 lb 9.6 oz (83.7 kg)   02/13/18 185 lb (83.9 kg)              We Performed the Following     Comprehensive metabolic panel (BMP + Alb, Alk Phos, ALT, AST, Total. Bili, TP)     UA reflex to Microscopic     Urine Culture Aerobic Bacterial     Vitamin D Deficiency        Primary Care Provider Office Phone # Fax #    [facility] [phone] [address]  [facility] [address]        Equal Access to Services     TOM HERNANDEZ : Hadii adebayo Jorge, waaxda luqadaha, qaybta kaalmada lydia, farzaneh hall. So [facility] [phone].    ATENCIÓN: Si habla español, tiene a oakley disposición servicios gratuitos de asistencia lingüística. Llame al [phone].    We comply with applicable federal civil rights laws and [place] laws. We do not discriminate on the basis of race, color, national origin, age, disability, sex, sexual orientation, or gender identity.            Thank you!     Thank you for choosing [facility]  for your care. Our goal is always to provide you with excellent care. Hearing back from our patients is one way we can continue to improve our services. Please take a few minutes to complete the written survey that you may receive in the mail after your visit with us. Thank you!             Your Updated Medication List - Protect others around you: Learn how to safely use, store and throw away your medicines at www.disposemymeds.org.          This list is accurate as of 6/18/18 12:20 PM.  Always use your most recent med list.                   Brand Name Dispense Instructions for use Diagnosis    cholecalciferol 85441 units capsule    VITAMIN D3    10 capsule    Take 1 capsule (50,000 Units) by mouth once a week    Vitamin D deficiency       desogestrel-ethinyl estradiol 0.15-30 MG-MCG per tablet    APRI    84  tablet    Take 1 tablet by mouth daily    Irregular menstrual cycle       DRYSOL EX           MULTI-VITAMIN DAILY PO           UNKNOWN MED DOSAGE           valACYclovir 500 MG tablet    VALTREX    14 tablet    Take 1 tablet (500 mg) by mouth 2 times daily as needed    Recurrent cold sores

## 2018-06-18 NOTE — PROGRESS NOTES
SUBJECTIVE:                                                   Keiko Farah is a 38 year old who presents to clinic today for the following health issue(s):  Patient presents with:  RECHECK: Vitamin D      HPI:  Patient diagnosed with vitamin D deficiency earlier this year. Was treated with high doses of vitamin D supplements. Needs recheck of vitamin D level to determine continued dose of vitamin D supplements.    Patient also reports that she has urinary frequency. Was recently treated for UTI over the phone. Finished all of her antibiotics and dysuria/pain resolved, but she continues to have frequency. Denies flank pain, fever or flu like symptoms     Patient's last menstrual period was 2018 (exact date).  Menstrual History: frequency: every 28-30 days  Patient is sexually active  .  Using tubal ligation for contraception.   Health maintenance updated:  yes  STI infx testing offered:  Declined    Last PHQ-9 score on record = No flowsheet data found.  Last GAD7 score on record = No flowsheet data found.      Problem list and histories reviewed & adjusted, as indicated.  Additional history: as documented.    Patient Active Problem List   Diagnosis     CARDIOVASCULAR SCREENING; LDL GOAL LESS THAN 160     BMI 30.0-30.9,adult     Plantar fasciitis     TMJ (temporomandibular joint syndrome)     Recurrent cold sores     Past Surgical History:   Procedure Laterality Date     ABDOMEN SURGERY      3 Cesarian sections     GYN SURGERY  10    Tubal Ligation     GYN SURGERY  2009    Colposcopy     wisdom teeth[        Social History   Substance Use Topics     Smoking status: Never Smoker     Smokeless tobacco: Never Used     Alcohol use 0.0 oz/week     0 Standard drinks or equivalent per week      Comment: 2/week      Problem (# of Occurrences) Relation (Name,Age of Onset)    Allergies (1) Son: Milk and Soy    CANCER (1) Paternal Uncle (48): Pancrreatic cancer    DIABETES (1) Mother (53): Type II  Diabetes    Eye Disorder (1) Sister: Glaucoma    Family History Negative (1) Father    GASTROINTESTINAL DISEASE (1) Maternal Grandmother    Obesity (1) Mother       Negative family history of: Coronary Artery Disease, CEREBROVASCULAR DISEASE, Breast Cancer, Colon Cancer, Thyroid Disease            Current Outpatient Prescriptions   Medication Sig     Aluminum Chloride (DRYSOL EX)      Multiple Vitamin (MULTI-VITAMIN DAILY PO)      UNKNOWN MED DOSAGE      valACYclovir (VALTREX) 500 MG tablet Take 1 tablet (500 mg) by mouth 2 times daily as needed     cholecalciferol (VITAMIN D3) 09806 UNITS capsule Take 1 capsule (50,000 Units) by mouth once a week (Patient not taking: Reported on 6/18/2018)     desogestrel-ethinyl estradiol (APRI) 0.15-30 MG-MCG per tablet Take 1 tablet by mouth daily (Patient not taking: Reported on 6/18/2018)     [DISCONTINUED] desogestrel-ethinyl estradiol (APRI) 0.15-30 MG-MCG per tablet Take 1 tablet by mouth daily (Patient not taking: Reported on 6/18/2018)     No current facility-administered medications for this visit.      Allergies   Allergen Reactions     Gluten Meal      headache     Milk Protein      Stomach ache, protein       ROS:  CONSTITUTIONAL: NEGATIVE for fever, chills, change in weight  GI: NEGATIVE for nausea, abdominal pain, heartburn, or change in bowel habits  : NEGATIVE for unusual vaginal symptoms. Periods are regular.  POSITIVE for urinary frequency  NEURO: NEGATIVE for weakness, dizziness or paresthesias  HEME/ALLERGY/IMMUNE: NEGATIVE for bleeding problems  PSYCHIATRIC: NEGATIVE for changes in mood or affect    OBJECTIVE:     /78  Wt 186 lb (84.4 kg)  LMP 05/23/2018 (Exact Date)  Breastfeeding? No  BMI 32.95 kg/m2  Body mass index is 32.95 kg/(m^2).    PHYSICAL EXAM:  Constitutional:  Appearance: Well nourished, well developed alert, in no acute distress  Chest:  Respiratory Effort:  Breathing unlabored  Gastrointestinal:  Abdominal Examination:  Abdomen  nontender to palpation, tone normal without rigidity or guarding, no masses present, umbilicus without lesions; Liver/Spleen:  No hepatomegaly present, liver nontender to palpation; Hernias:  No hernias present  Neurologic/Psychiatric:  Mental Status:  Oriented X3      In-Clinic Test Results:    Results for orders placed or performed in visit on 06/18/18   UA reflex to Microscopic   Result Value Ref Range    Color Urine Yellow     Appearance Urine Clear     Glucose Urine Negative NEG^Negative mg/dL    Bilirubin Urine Negative NEG^Negative    Ketones Urine Negative NEG^Negative mg/dL    Specific Gravity Urine 1.015 1.003 - 1.035    Blood Urine Negative NEG^Negative    pH Urine 6.0 5.0 - 7.0 pH    Protein Albumin Urine Negative NEG^Negative mg/dL    Urobilinogen Urine 0.2 0.2 - 1.0 EU/dL    Nitrite Urine Negative NEG^Negative    Leukocyte Esterase Urine Negative NEG^Negative    Source Midstream Urine        ASSESSMENT/PLAN:                                                        ICD-10-CM    1. Urinary frequency R35.0 UA reflex to Microscopic     Urine Culture Aerobic Bacterial     Comprehensive metabolic panel (BMP + Alb, Alk Phos, ALT, AST, Total. Bili, TP)   2. Vitamin D deficiency E55.9 Vitamin D Deficiency       PLAN:    UA completely normal. Other labs are pending. Will advise patient of results when they are available.    YUNIOR Westfall, CNM

## 2018-06-19 LAB
BACTERIA SPEC CULT: NO GROWTH
DEPRECATED CALCIDIOL+CALCIFEROL SERPL-MC: 49 UG/L (ref 20–75)
SPECIMEN SOURCE: NORMAL

## 2018-07-25 NOTE — TELEPHONE ENCOUNTER
Patient has been seen in the office and had blood work since this encounter.  Encounter closed  Kianna Espino CMA

## 2018-09-25 DIAGNOSIS — E55.9 VITAMIN D DEFICIENCY: ICD-10-CM

## 2018-09-25 DIAGNOSIS — N92.6 IRREGULAR MENSTRUAL CYCLE: ICD-10-CM

## 2018-09-25 RX ORDER — DESOGESTREL AND ETHINYL ESTRADIOL 0.15-0.03
1 KIT ORAL DAILY
Qty: 84 TABLET | Refills: 4 | Status: SHIPPED | OUTPATIENT
Start: 2018-09-25 | End: 2021-03-30

## 2018-09-25 NOTE — TELEPHONE ENCOUNTER
Reason for Call:  Keiko needs new 90 day Rx's for [facility]. They said they can't take a transferred Rx.    Vitamin D and Birth Contol      Pharmacy preferred (if calling for a refill): [facility]    Lorene Castillo  Patient Representative

## 2018-10-22 ENCOUNTER — APPOINTMENT (OUTPATIENT)
Dept: GENERAL RADIOLOGY | Facility: CLINIC | Age: 38
End: 2018-10-22
Attending: EMERGENCY MEDICINE
Payer: COMMERCIAL

## 2018-10-22 ENCOUNTER — TELEPHONE (OUTPATIENT)
Dept: OBGYN | Facility: CLINIC | Age: 38
End: 2018-10-22

## 2018-10-22 ENCOUNTER — HOSPITAL ENCOUNTER (EMERGENCY)
Facility: CLINIC | Age: 38
Discharge: HOME | End: 2018-10-22
Attending: EMERGENCY MEDICINE | Admitting: EMERGENCY MEDICINE
Payer: COMMERCIAL

## 2018-10-22 VITALS
SYSTOLIC BLOOD PRESSURE: 135 MMHG | RESPIRATION RATE: 18 BRPM | DIASTOLIC BLOOD PRESSURE: 61 MMHG | TEMPERATURE: 98.5 F | HEART RATE: 74 BPM | OXYGEN SATURATION: 100 %

## 2018-10-22 DIAGNOSIS — M25.512 ACUTE PAIN OF LEFT SHOULDER: ICD-10-CM

## 2018-10-22 LAB
ALBUMIN UR-MCNC: NEGATIVE MG/DL
ANION GAP SERPL CALCULATED.3IONS-SCNC: 5 MMOL/L (ref 3–14)
APPEARANCE UR: CLEAR
BACTERIA #/AREA URNS HPF: ABNORMAL /HPF
BASOPHILS # BLD AUTO: 0.1 10E9/L (ref 0–0.2)
BASOPHILS NFR BLD AUTO: 1.2 %
BILIRUB UR QL STRIP: NEGATIVE
BUN SERPL-MCNC: 14 MG/DL (ref 7–30)
CALCIUM SERPL-MCNC: 8.3 MG/DL (ref 8.5–10.1)
CHLORIDE SERPL-SCNC: 107 MMOL/L (ref 94–109)
CO2 SERPL-SCNC: 25 MMOL/L (ref 20–32)
COLOR UR AUTO: YELLOW
CREAT SERPL-MCNC: 0.75 MG/DL (ref 0.52–1.04)
DIFFERENTIAL METHOD BLD: ABNORMAL
EOSINOPHIL # BLD AUTO: 0.2 10E9/L (ref 0–0.7)
EOSINOPHIL NFR BLD AUTO: 2.1 %
ERYTHROCYTE [DISTWIDTH] IN BLOOD BY AUTOMATED COUNT: 16.2 % (ref 10–15)
GFR SERPL CREATININE-BSD FRML MDRD: 86 ML/MIN/1.7M2
GLUCOSE SERPL-MCNC: 90 MG/DL (ref 70–99)
GLUCOSE UR STRIP-MCNC: NEGATIVE MG/DL
HCG SERPL QL: NEGATIVE
HCT VFR BLD AUTO: 33.2 % (ref 35–47)
HGB BLD-MCNC: 10.2 G/DL (ref 11.7–15.7)
HGB UR QL STRIP: NEGATIVE
IMM GRANULOCYTES # BLD: 0 10E9/L (ref 0–0.4)
IMM GRANULOCYTES NFR BLD: 0.3 %
KETONES UR STRIP-MCNC: NEGATIVE MG/DL
LEUKOCYTE ESTERASE UR QL STRIP: ABNORMAL
LYMPHOCYTES # BLD AUTO: 1.9 10E9/L (ref 0.8–5.3)
LYMPHOCYTES NFR BLD AUTO: 25.7 %
MAGNESIUM SERPL-MCNC: 2.1 MG/DL (ref 1.6–2.3)
MCH RBC QN AUTO: 22.9 PG (ref 26.5–33)
MCHC RBC AUTO-ENTMCNC: 30.7 G/DL (ref 31.5–36.5)
MCV RBC AUTO: 75 FL (ref 78–100)
MONOCYTES # BLD AUTO: 0.4 10E9/L (ref 0–1.3)
MONOCYTES NFR BLD AUTO: 4.9 %
MUCOUS THREADS #/AREA URNS LPF: PRESENT /LPF
NEUTROPHILS # BLD AUTO: 4.9 10E9/L (ref 1.6–8.3)
NEUTROPHILS NFR BLD AUTO: 65.8 %
NITRATE UR QL: NEGATIVE
NRBC # BLD AUTO: 0 10*3/UL
NRBC BLD AUTO-RTO: 0 /100
PH UR STRIP: 5 PH (ref 5–7)
PLATELET # BLD AUTO: 337 10E9/L (ref 150–450)
POTASSIUM SERPL-SCNC: 4 MMOL/L (ref 3.4–5.3)
RBC # BLD AUTO: 4.45 10E12/L (ref 3.8–5.2)
RBC #/AREA URNS AUTO: 2 /HPF (ref 0–2)
SODIUM SERPL-SCNC: 137 MMOL/L (ref 133–144)
SOURCE: ABNORMAL
SP GR UR STRIP: 1.02 (ref 1–1.03)
SQUAMOUS #/AREA URNS AUTO: 2 /HPF (ref 0–1)
TSH SERPL DL<=0.005 MIU/L-ACNC: 0.71 MU/L (ref 0.4–4)
UROBILINOGEN UR STRIP-MCNC: 0 MG/DL (ref 0–2)
WBC # BLD AUTO: 7.5 10E9/L (ref 4–11)
WBC #/AREA URNS AUTO: 1 /HPF (ref 0–5)

## 2018-10-22 PROCEDURE — 84443 ASSAY THYROID STIM HORMONE: CPT | Performed by: EMERGENCY MEDICINE

## 2018-10-22 PROCEDURE — 99285 EMERGENCY DEPT VISIT HI MDM: CPT | Mod: 25

## 2018-10-22 PROCEDURE — 84703 CHORIONIC GONADOTROPIN ASSAY: CPT | Performed by: EMERGENCY MEDICINE

## 2018-10-22 PROCEDURE — 93005 ELECTROCARDIOGRAM TRACING: CPT

## 2018-10-22 PROCEDURE — 80048 BASIC METABOLIC PNL TOTAL CA: CPT | Performed by: EMERGENCY MEDICINE

## 2018-10-22 PROCEDURE — 81001 URINALYSIS AUTO W/SCOPE: CPT | Performed by: EMERGENCY MEDICINE

## 2018-10-22 PROCEDURE — 71046 X-RAY EXAM CHEST 2 VIEWS: CPT

## 2018-10-22 PROCEDURE — 83735 ASSAY OF MAGNESIUM: CPT | Performed by: EMERGENCY MEDICINE

## 2018-10-22 PROCEDURE — 73030 X-RAY EXAM OF SHOULDER: CPT | Mod: LT

## 2018-10-22 PROCEDURE — 85025 COMPLETE CBC W/AUTO DIFF WBC: CPT | Performed by: EMERGENCY MEDICINE

## 2018-10-22 RX ORDER — LIDOCAINE 40 MG/G
CREAM TOPICAL
Status: DISCONTINUED | OUTPATIENT
Start: 2018-10-22 | End: 2018-10-22 | Stop reason: HOSPADM

## 2018-10-22 RX ORDER — KETOROLAC TROMETHAMINE 15 MG/ML
15 INJECTION, SOLUTION INTRAMUSCULAR; INTRAVENOUS ONCE
Status: DISCONTINUED | OUTPATIENT
Start: 2018-10-22 | End: 2018-10-22 | Stop reason: HOSPADM

## 2018-10-22 ASSESSMENT — ENCOUNTER SYMPTOMS
FEVER: 0
FREQUENCY: 0
MYALGIAS: 1
CHILLS: 0
NECK PAIN: 0
PALPITATIONS: 1
NUMBNESS: 0
BACK PAIN: 0
SHORTNESS OF BREATH: 0
ARTHRALGIAS: 1
DYSURIA: 0

## 2018-10-22 NOTE — ED AVS SNAPSHOT
[facility] Emergency Department    [address]    [facility] [address]    Phone:  [phone]    Fax:  [phone]                                       Keiko Farah   MRN: [id number]    Department:  [facility] Emergency Department   Date of Visit:  10/22/2018           Patient Information     Date Of Birth          1980        Your diagnoses for this visit were:     Acute pain of left shoulder        You were seen by Agustín Head MD and Stanislaw Victoria MD.      Follow-up Information     Follow up with [facility] [place]. Schedule an appointment as soon as possible for a visit in 2 days.    Contact information:    [address], [facility]  [facility] [address]  [phone]          Follow up with [facility] Emergency Department.    Specialty:  EMERGENCY MEDICINE    Contact information:    [address] [facility] [address] [phone]        Follow up with [facility]. Schedule an appointment as soon as possible for a visit in 2 days.    Contact information:    [address] [place] [address]  [phone]          Discharge Instructions       Please follow-up with your primary care provider and physical therapy..    Please use ice, tylenol and ibuprofen for pain.    Please limit use of left arm, though continue with movements as able.      Shoulder Problems  Arthritis, injury, bone disease, and torn muscles and tendons can cause pain, stiffness, and sometimes swelling in your shoulder. Then even simple movements become painful and difficult.    Osteoarthritis  Osteoarthritis is a wearing away of your joint. Your cartilage becomes cracked and pitted, and your socket may wear down. Eventually, bone is exposed and may develop growths called spurs. Without a cushion of cartilage, your joint becomes stiff and painful. It may feel as if it s grinding or slipping out of place when you move your  arm.    Inflammatory (rheumatoid) arthritis  Inflammatory arthritis is a chronic joint disease. Your synovium (the membrane that lines your joints) thickens. It then forms a tissue growth (pannus) that clings to your cartilage and releases chemicals that destroy it. Your joint may become red, swollen, and warm. Pain may radiate into your neck and arm. Over time, your joint may get stiff and your muscles may weaken from disuse. Your bone may also be destroyed.     Fracture  A fracture can occur when you fall on the shoulder or on an outstretched hand or elbow. The ball and/or tuberosities can break off, leaving your arm bone in pieces. A fractured shoulder is painful and may be black and blue and look deformed.    Avascular necrosis  A number of conditions, including long-term use of steroids or alcohol, can cause the blood supply to your bone to be cut off. As the bone dies, it collapses. Your shoulder becomes painful and movement is limited.    Rotator cuff tear  A chronic rotator cuff tear may lead to severe arthritis. As the ball rides up against your acromion, your joint becomes painful, stiff, and weak. Surgery can relieve the pain, but you may never regain full flexibility and strength.  Date Last Reviewed: 1/1/2018 2000-2017 [employer]. [address] [place] [address], [place] [address]. All rights reserved. This information is not intended as a substitute for professional medical care. Always follow your healthcare professional's instructions.          24 Hour Appointment Hotline       To make an appointment at any [facility], call [phone] ([phone]). If you don't have a family doctor or clinic, we will help you find one. [facility] are conveniently located to serve the needs of you and your family.             Review of your medicines      Our records show that you are taking the medicines listed below. If these are incorrect, please call your family doctor or clinic.         Dose / Directions Last dose taken    * cholecalciferol 82007 units capsule   Commonly known as:  VITAMIN D3   Dose:  1 capsule   Quantity:  10 capsule        Take 1 capsule (50,000 Units) by mouth once a week   Refills:  0        * cholecalciferol 1000 UNIT tablet   Commonly known as:  vitamin D3   Dose:  1000 Units   Quantity:  100 tablet        Take 1 tablet (1,000 Units) by mouth daily   Refills:  3        desogestrel-ethinyl estradiol 0.15-30 MG-MCG per tablet   Commonly known as:  APRI   Dose:  1 tablet   Quantity:  84 tablet        Take 1 tablet by mouth daily   Refills:  4        DRYSOL EX        Refills:  0        MULTI-VITAMIN DAILY PO        Refills:  0        UNKNOWN MED DOSAGE        Refills:  0        valACYclovir 500 MG tablet   Commonly known as:  VALTREX   Dose:  500 mg   Quantity:  14 tablet        Take 1 tablet (500 mg) by mouth 2 times daily as needed   Refills:  1        * Notice:  This list has 2 medication(s) that are the same as other medications prescribed for you. Read the directions carefully, and ask your doctor or other care provider to review them with you.            Procedures and tests performed during your visit     Basic metabolic panel    CBC with platelets differential    EKG 12 lead    HCG qualitative    ISTAT HCG Quantitative Pregnancy Nursing POCT    Magnesium    Peripheral IV catheter    Pulse oximetry nursing    TSH    UA with Microscopic    XR Chest 2 Views    XR Shoulder Left G/E 3 Views      Orders Needing Specimen Collection     None      Pending Results     Date and Time Order Name Status Description    10/22/2018 1457 EKG 12 lead Preliminary             Pending Culture Results     No orders found from 10/20/2018 to 10/23/2018.            Pending Results Instructions     If you had any lab results that were not finalized at the time of your Discharge, you can call the ED Lab Result RN at [phone]. You will be contacted by this team for any positive Lab results or  changes in treatment. The nurses are available 7 days a week from 10A to 6:30P.  You can leave a message 24 hours per day and they will return your call.        Test Results From Your Hospital Stay        10/22/2018  2:17 PM      Component Results     Component Value Ref Range & Units Status    WBC 7.5 4.0 - 11.0 10e9/L Final    RBC Count 4.45 3.8 - 5.2 10e12/L Final    Hemoglobin 10.2 (L) 11.7 - 15.7 g/dL Final    Hematocrit 33.2 (L) 35.0 - 47.0 % Final    MCV 75 (L) 78 - 100 fl Final    MCH 22.9 (L) 26.5 - 33.0 pg Final    MCHC 30.7 (L) 31.5 - 36.5 g/dL Final    RDW 16.2 (H) 10.0 - 15.0 % Final    Platelet Count 337 150 - 450 10e9/L Final    Diff Method Automated Method  Final    % Neutrophils 65.8 % Final    % Lymphocytes 25.7 % Final    % Monocytes 4.9 % Final    % Eosinophils 2.1 % Final    % Basophils 1.2 % Final    % Immature Granulocytes 0.3 % Final    Nucleated RBCs 0 0 /100 Final    Absolute Neutrophil 4.9 1.6 - 8.3 10e9/L Final    Absolute Lymphocytes 1.9 0.8 - 5.3 10e9/L Final    Absolute Monocytes 0.4 0.0 - 1.3 10e9/L Final    Absolute Eosinophils 0.2 0.0 - 0.7 10e9/L Final    Absolute Basophils 0.1 0.0 - 0.2 10e9/L Final    Abs Immature Granulocytes 0.0 0 - 0.4 10e9/L Final    Absolute Nucleated RBC 0.0  Final         10/22/2018  2:29 PM      Component Results     Component Value Ref Range & Units Status    Sodium 137 133 - 144 mmol/L Final    Potassium 4.0 3.4 - 5.3 mmol/L Final    Chloride 107 94 - 109 mmol/L Final    Carbon Dioxide 25 20 - 32 mmol/L Final    Anion Gap 5 3 - 14 mmol/L Final    Glucose 90 70 - 99 mg/dL Final    Urea Nitrogen 14 7 - 30 mg/dL Final    Creatinine 0.75 0.52 - 1.04 mg/dL Final    GFR Estimate 86 >60 mL/min/1.7m2 Final    Non  GFR Calc    GFR Estimate If Black >90 >60 mL/min/1.7m2 Final    African American GFR Calc    Calcium 8.3 (L) 8.5 - 10.1 mg/dL Final         10/22/2018  2:26 PM      Component Results     Component Value Ref Range & Units Status     Color Urine Yellow  Final    Appearance Urine Clear  Final    Glucose Urine Negative NEG^Negative mg/dL Final    Bilirubin Urine Negative NEG^Negative Final    Ketones Urine Negative NEG^Negative mg/dL Final    Specific Gravity Urine 1.021 1.003 - 1.035 Final    Blood Urine Negative NEG^Negative Final    pH Urine 5.0 5.0 - 7.0 pH Final    Protein Albumin Urine Negative NEG^Negative mg/dL Final    Urobilinogen mg/dL 0.0 0.0 - 2.0 mg/dL Final    Nitrite Urine Negative NEG^Negative Final    Leukocyte Esterase Urine Small (A) NEG^Negative Final    Source Midstream Urine  Final    WBC Urine 1 0 - 5 /HPF Final    RBC Urine 2 0 - 2 /HPF Final    Bacteria Urine Few (A) NEG^Negative /HPF Final    Squamous Epithelial /HPF Urine 2 (H) 0 - 1 /HPF Final    Mucous Urine Present (A) NEG^Negative /LPF Final         10/22/2018  2:43 PM      Narrative     CHEST TWO VIEWS  10/22/2018 2:31 PM     HISTORY:  Left shoulder pain.    COMPARISON: None.        Impression     IMPRESSION: Negative chest. Lungs clear.    PAULO MOORE MD         10/22/2018  2:49 PM      Narrative     LEFT SHOULDER THREE VIEWS  10/22/2018 2:32 PM     HISTORY: Left shoulder pain.    COMPARISON: None.        Impression     IMPRESSION:  No evidence for acute fracture or dislocation involving  the left shoulder.     PAULO MOORE MD         10/22/2018  3:27 PM      Component Results     Component Value Ref Range & Units Status    HCG Qualitative Serum Negative NEG^Negative Final    This test is for screening purposes.  Results should be interpreted along with   the clinical picture.  Confirmation testing is available if warranted by   ordering [id number], HCG Quantitative Pregnancy.           10/22/2018  2:59 PM      Component Results     Component Value Ref Range & Units Status    Magnesium 2.1 1.6 - 2.3 mg/dL Final         10/22/2018  3:37 PM      Component Results     Component Value Ref Range & Units Status    TSH 0.71 0.40 - 4.00 mU/L Final                 Clinical Quality Measure: Blood Pressure Screening     Your blood pressure was checked while you were in the emergency department today. The last reading we obtained was  BP: 135/61 . Please read the guidelines below about what these numbers mean and what you should do about them.  If your systolic blood pressure (the top number) is less than 120 and your diastolic blood pressure (the bottom number) is less than 80, then your blood pressure is normal. There is nothing more that you need to do about it.  If your systolic blood pressure (the top number) is 120-139 or your diastolic blood pressure (the bottom number) is 80-89, your blood pressure may be higher than it should be. You should have your blood pressure rechecked within a year by a primary care provider.  If your systolic blood pressure (the top number) is 140 or greater or your diastolic blood pressure (the bottom number) is 90 or greater, you may have high blood pressure. High blood pressure is treatable, but if left untreated over time it can put you at risk for heart attack, stroke, or kidney failure. You should have your blood pressure rechecked by a primary care provider within the next 4 weeks.  If your provider in the emergency department today gave you specific instructions to follow-up with your doctor or provider even sooner than that, you should follow that instruction and not wait for up to 4 weeks for your follow-up visit.        Thank you for choosing [facility]       Thank you for choosing [facility] for your care. Our goal is always to provide you with excellent care. Hearing back from our patients is one way we can continue to improve our services. Please take a few minutes to complete the written survey that you may receive in the mail after you visit with us. Thank you!        [employer] Information     [employer] gives you secure access to your electronic health record. If you see a primary care provider, you can also send messages to your care  team and make appointments. If you have questions, please call your primary care clinic.  If you do not have a primary care provider, please call [phone] and they will assist you.        Care EveryWhere ID     This is your Care EveryWhere ID. This could be used by other organizations to access your [place] medical records  [id number]        Equal Access to Services     TOM HERNANDEZ : Ale Jorge, ericka carmona, farzaneh ko. So [facility] [phone].    ATENCIÓN: Si habla español, tiene a oakley disposición servicios gratuitos de asistencia lingüística. Llame al [phone].    We comply with applicable federal civil rights laws and [place] laws. We do not discriminate on the basis of race, color, national origin, age, disability, sex, sexual orientation, or gender identity.            After Visit Summary       This is your record. Keep this with you and show to your community pharmacist(s) and doctor(s) at your next visit.

## 2018-10-22 NOTE — TELEPHONE ENCOUNTER
Patient walked into clinic lobby this morning with questions about her symptoms. She reports that her pharmacy switched her to another generic brand of her COCs. She was started on Apri earlier this year. She had no negative symptoms on Apri. She started the new generic formulation ~ one week ago. Since then, she has noted intermittent chest pain and left arm pain. She discontinued her NENA a few days ago, but she still continues to have left arm and chest pain. Patient denies any SOB, or dizziness. Advised patient to go to ER for further evaluation. Patient called son to transport her to ER now. Patient declines ambulance transportation.     YUNIOR Westfall, TIARA

## 2018-10-22 NOTE — ED TRIAGE NOTES
Patient presents to the ED reporting left arm pain x 1 week. Denies known injury. Saw chiropractor with no relief. Patient also noted some dark colored urine. Reports that forgot to take a dose of her birth control and arm pain seemed to improve, but then returned after she took another dose. Also has had some mild palpitations.  Advised by clinic to come in for eval.

## 2018-10-22 NOTE — DISCHARGE INSTRUCTIONS
Please follow-up with your primary care provider and physical therapy..    Please use ice, tylenol and ibuprofen for pain.    Please limit use of left arm, though continue with movements as able.      Shoulder Problems  Arthritis, injury, bone disease, and torn muscles and tendons can cause pain, stiffness, and sometimes swelling in your shoulder. Then even simple movements become painful and difficult.    Osteoarthritis  Osteoarthritis is a wearing away of your joint. Your cartilage becomes cracked and pitted, and your socket may wear down. Eventually, bone is exposed and may develop growths called spurs. Without a cushion of cartilage, your joint becomes stiff and painful. It may feel as if it s grinding or slipping out of place when you move your arm.    Inflammatory (rheumatoid) arthritis  Inflammatory arthritis is a chronic joint disease. Your synovium (the membrane that lines your joints) thickens. It then forms a tissue growth (pannus) that clings to your cartilage and releases chemicals that destroy it. Your joint may become red, swollen, and warm. Pain may radiate into your neck and arm. Over time, your joint may get stiff and your muscles may weaken from disuse. Your bone may also be destroyed.     Fracture  A fracture can occur when you fall on the shoulder or on an outstretched hand or elbow. The ball and/or tuberosities can break off, leaving your arm bone in pieces. A fractured shoulder is painful and may be black and blue and look deformed.    Avascular necrosis  A number of conditions, including long-term use of steroids or alcohol, can cause the blood supply to your bone to be cut off. As the bone dies, it collapses. Your shoulder becomes painful and movement is limited.    Rotator cuff tear  A chronic rotator cuff tear may lead to severe arthritis. As the ball rides up against your acromion, your joint becomes painful, stiff, and weak. Surgery can relieve the pain, but you may never regain full  flexibility and strength.  Date Last Reviewed: 1/1/2018 2000-2017 [employer]. [address] [place] [address], [place] [address]. All rights reserved. This information is not intended as a substitute for professional medical care. Always follow your healthcare professional's instructions.

## 2018-10-22 NOTE — ED PROVIDER NOTES
"  History     Chief Complaint:  Shoulder Pain    HPI   Keiko Farah is a right handed 38 year old female who presents to the ED for evaluation of left arm and shoulder pain. The patient reports that she developed the onset of localized left shoulder pain one week ago after sleeping on her left side. She was evaluated for this by a chiropractor five days ago, who performed some muscle work and told her it was likely a muscular issue. Since then, the patient's pain has persisted, and she states that it is exacerbated by lifting her arm. Two days ago, the patient also noticed that her urine was darker than usual, and she experienced two episodes of palpitations yesterday while sitting on the couch. She called her OB/GYN, Jihan James, today, who referred her to the ED for workup. Here, the patient rates her pain as a 4/10 without pain medication and states \"I feel like I pulled it out of the socket.\" She denies that her arm appears more swollen than usual and she has no right arm symptoms. The patient also denies any new neck pain, back pain, numbness, fevers, chills, dysuria, frequency, syncope, shortness of breath, or chest pain. She also denies any recent trauma, falls, or injuries. Of note, last week the patient switched to the generic form of her usual birth control, Apri, and she feels as though her symptoms are associated with taking her birth control.     Allergies:  Gluten meal  Milk protein    Medications:    Drysol  Apri  Valtrex    Past Medical History:    TMJ  Recurrent cold sores  Plantar fasciitis  Obesity  HPV in female  Recurrent UTI    Past Surgical History:     x3  Tubal ligation  Colposcopy  [place] teeth extraction    Family History:    Diabetes  Obesity  Glaucoma    Social History:  Negative for tobacco use.  Alcohol use: yes  Marital Status:       Review of Systems   Constitutional: Negative for chills and fever.   Respiratory: Negative for shortness of breath.    Cardiovascular: " Positive for palpitations. Negative for chest pain.   Genitourinary: Negative for dysuria and frequency.   Musculoskeletal: Positive for arthralgias and myalgias. Negative for back pain and neck pain.   Neurological: Negative for syncope and numbness.   All other systems reviewed and are negative.      Physical Exam     Patient Vitals for the past 24 hrs:   BP Temp Temp src Pulse Heart Rate Resp SpO2   10/22/18 1530 135/61 - - - 76 - 100 %   10/22/18 1515 124/83 - - - 61 - 100 %   10/22/18 1500 119/70 - - - 64 - 99 %   10/22/18 1415 125/77 - - - - - -   10/22/18 1400 125/80 - - - 64 - 100 %   10/22/18 1345 126/81 - - - 64 - 100 %   10/22/18 1250 (!) 127/94 98.5  F (36.9  C) Temporal 74 - 18 97 %        Physical Exam  General:                        Well-nourished                        Speaking in full sentences  Eyes:                        Conjunctiva without injection or scleral icterus  ENT:                        Moist mucous membranes                        Nares patent                        Pinnae normal  Neck:                        Full ROM                        No stiffness appreciated  Resp:                        Lungs CTAB                        No crackles, wheezing or audible rubs                        Good air movement  CV:                                        Normal rate, regular rhythm                        S1 and S2 present                        No murmur, gallop or rub  GI:                        BS present                        Abdomen soft without distention                        Non-tender to light and deep palpation                        No guarding or rebound tenderness  Skin:                        Warm, dry, well perfused                        No rashes or open wounds on exposed skin  MSK:                        Moves all extremities                        No focal deformities or swelling                        No asymmetry in size of RUE vs LUE                        Distinctly  reproducible pain with passive shoulder abduction to 90 degrees, with pain over supraspinatus muscle                        Able to internally/externally rotate at the shoulder                        Extremities are warm, well-perfused with 2+ radial pulse                        No overlying skin changes                        Extremities are without calf swelling or tenderness                        Compartments are soft  Neuro:                        Alert                        5/5  strength                        SILT in BUE                        Answers questions appropriately                        Moves all extremities equally                        Gait stable  Psych:                        Normal affect, normal mood    Emergency Department Course   ECG:  Indication: Left arm pain  Time: 1256  Vent. Rate 68 bpm. OR interval 174. QRS duration 88. QT/QTc 400/425. P-R-T axis 53 65 26.  Normal sinus rhythm with sinus arrhythmia. Normal ECG. Read time: 1357    Imaging:  Radiographic findings were communicated with the patient who voiced understanding of the findings.  XR Chest 2 views:   Negative chest. Lungs clear. As per radiology.    XR Shoulder 3 views, left:   No evidence for acute fracture or dislocation involving  the left shoulder,  as per radiology.     Laboratory:  CBC: WBC: 7.5, HGB: 10.2 (L), PLT: 337  BMP: Calcium 8.3 (L), o/w WNL (Creatinine: 0.75)    Magnesium: 2.1    TSH: 0.71    UA with Microscopic: Leukocyte esterase small (A), SE 2 (H), Bacteria few (A), Mucous present (A), o/w WNL     HCG: Negative    Emergency Department Course:  Nursing notes and vitals reviewed.   (8186) I performed an exam of the patient as documented above.     EKG obtained in the ED, see results above.      Blood drawn. This was sent to the lab for further testing, results above.    The patient provided a urine sample here in the emergency department. This was sent for laboratory testing, findings above.      The  patient was sent for a chest x-ray and shoulder x-ray while in the emergency department, findings above.     (8430) I rechecked the patient and discussed the results of her workup thus far. At this time, she felt much better and was comfortable with discharge home.    Findings and plan explained to the Patient. Patient discharged home with instructions regarding supportive care, medications, and reasons to return. The importance of close follow-up was reviewed.     I personally reviewed the laboratory results with the Patient and answered all related questions prior to discharge.     Impression & Plan    Medical Decision Making:  Keiko Farah is a very pleasant 38-year-old female presenting to the emergency department for evaluation of left arm pain and shoulder pain.  VS on presentation are within normal limits.  Examination is notable for focal pain to palpation over the patient's left shoulder at the acromion, exacerbated by passive abduction at the shoulder.  This is very suspicious for musculoligamentous pain and shoulder impingement.  She noted symptoms first upon awakening, and noted having slept on her left shoulder.  Other causes were certainly considered.  Compartments to her upper extremity are soft.  Her extremities are warm and well-perfused with a strong radial pulse.  No evidence of compartment syndrome.  As she recently transitioned to a new OCP, DVT was strongly considered. Presently, however, I feel this to be unlikely.  Again her exam demonstrates distinctly reproducible pain with passive abduction.  Her extremities are without swelling.  She has no subjective nor objective weakness or numbness to suggest cervical radiculopathy or cord compression.  There are no overlying skin changes consistent with cellulitis or shingles.  There is no joint swelling or warmth to suggest septic arthritis.  Patient additionally reports intermittent palpitations.  EKG demonstrates sinus rhythm without findings of  acute ischemia or dysrhythmia.  History and exam are not felt consistent with PE.  Patient is without tachycardia and or hypoxia.  Presentation is not consistent with ACS.  She additionally describes darker colored urine, and her urinalysis is without evidence of blood nor signs of infection.  Clinical impression was discussed with patient.  At this point I feel she is stable for discharge home with supportive outpatient treatment.  I have placed a referral to establish care with [facility], as I do feel she may benefit from physical therapy.  She is welcome to return to the ER at any point with worsening pain, difficulty breathing, or any other concerns.  All questions answered prior to discharge.    Diagnosis:    ICD-10-CM    1. Acute pain of left shoulder M25.512        Disposition:  discharged to home    Scribe Disclosure:  I, Janice Almanzar, am serving as a scribe on 10/22/2018 at 2:36 PM to personally document services performed by Stanislaw Victoria MD based on my observations and the provider's statements to me.      Janice Almanzar  10/22/2018   [facility] EMERGENCY DEPARTMENT       Stanislaw Victoria MD  10/22/18 2725

## 2018-10-22 NOTE — ED AVS SNAPSHOT
[facility] Emergency Department    [address]    [facility] [address]    Phone:  [phone]    Fax:  [phone]                                       Keiko Farah   MRN: [id number]    Department:  [facility] Emergency Department   Date of Visit:  10/22/2018           After Visit Summary Signature Page     I have received my discharge instructions, and my questions have been answered. I have discussed any challenges I see with this plan with the nurse or doctor.    ..........................................................................................................................................  Patient/Patient Representative Signature      ..........................................................................................................................................  Patient Representative Print Name and Relationship to Patient    ..................................................               ................................................  Date                                   Time    ..........................................................................................................................................  Reviewed by Signature/Title    ...................................................              ..............................................  Date                                               Time          22EPIC Rev 08/18

## 2018-10-23 LAB — INTERPRETATION ECG - MUSE: NORMAL

## 2018-11-01 ENCOUNTER — TELEPHONE (OUTPATIENT)
Dept: OBGYN | Facility: CLINIC | Age: 38
End: 2018-11-01

## 2018-11-01 NOTE — TELEPHONE ENCOUNTER
Fax received from [facility] pharmacy stating Vitamin D3 1000Unit tablets are not covered by the patient's insurance plan. Provider requests a PA.        Evy Vidal RN

## 2018-11-02 NOTE — TELEPHONE ENCOUNTER
PRIOR AUTHORIZATION DENIED    Medication: VITAMIN D3 1000UNIT - DENIED    Denial Date: 11/2/2018    Denial Rational: PATIENTS BENEFIT PLAN DOES NOT COVER OTC MEDICATIONS.        Appeal Information:

## 2018-11-02 NOTE — TELEPHONE ENCOUNTER
Request has been submitted via [facility]. Waiting for questions to generate before completing PA.

## 2018-11-02 NOTE — TELEPHONE ENCOUNTER
PA Initiation    Medication: VITAMIN D3 1000UNIT  Insurance Company: [employer] - Phone [phone] Fax [phone]  Pharmacy Filling the Rx[facility] - [place], [place] - [address] AT PORTAL TO REGISTERED [facility] SITES  Filling Pharmacy Phone: [phone]  Filling Pharmacy Fax:    Start Date: 11/2/2018

## 2018-11-05 NOTE — TELEPHONE ENCOUNTER
Left message on answering machine regarding PA denial. patient to call back if any questions.  Anastasia Hannah RN

## 2019-02-12 ENCOUNTER — ANCILLARY PROCEDURE (OUTPATIENT)
Dept: MAMMOGRAPHY | Facility: CLINIC | Age: 39
End: 2019-02-12
Payer: COMMERCIAL

## 2019-02-12 DIAGNOSIS — Z12.31 VISIT FOR SCREENING MAMMOGRAM: ICD-10-CM

## 2019-02-12 PROCEDURE — 77067 SCR MAMMO BI INCL CAD: CPT | Mod: TC

## 2019-02-18 ENCOUNTER — OFFICE VISIT (OUTPATIENT)
Dept: OBGYN | Facility: CLINIC | Age: 39
End: 2019-02-18
Payer: COMMERCIAL

## 2019-02-18 ENCOUNTER — TELEPHONE (OUTPATIENT)
Dept: OBGYN | Facility: CLINIC | Age: 39
End: 2019-02-18

## 2019-02-18 VITALS — SYSTOLIC BLOOD PRESSURE: 112 MMHG | DIASTOLIC BLOOD PRESSURE: 72 MMHG | BODY MASS INDEX: 33.66 KG/M2 | WEIGHT: 190 LBS

## 2019-02-18 DIAGNOSIS — N92.0 MENORRHAGIA WITH REGULAR CYCLE: ICD-10-CM

## 2019-02-18 DIAGNOSIS — E66.811 OBESITY (BMI 30.0-34.9): ICD-10-CM

## 2019-02-18 DIAGNOSIS — D64.9 ANEMIA, UNSPECIFIED TYPE: ICD-10-CM

## 2019-02-18 DIAGNOSIS — Z01.419 ENCOUNTER FOR ANNUAL ROUTINE GYNECOLOGICAL EXAMINATION: Primary | ICD-10-CM

## 2019-02-18 DIAGNOSIS — R73.03 PREDIABETES: Primary | ICD-10-CM

## 2019-02-18 LAB
ERYTHROCYTE [DISTWIDTH] IN BLOOD BY AUTOMATED COUNT: 17.2 % (ref 10–15)
HBA1C MFR BLD: 5.8 % (ref 0–5.6)
HCT VFR BLD AUTO: 34.4 % (ref 35–47)
HGB BLD-MCNC: 10.5 G/DL (ref 11.7–15.7)
MCH RBC QN AUTO: 22.1 PG (ref 26.5–33)
MCHC RBC AUTO-ENTMCNC: 30.5 G/DL (ref 31.5–36.5)
MCV RBC AUTO: 72 FL (ref 78–100)
PLATELET # BLD AUTO: 346 10E9/L (ref 150–450)
RBC # BLD AUTO: 4.75 10E12/L (ref 3.8–5.2)
WBC # BLD AUTO: 7.2 10E9/L (ref 4–11)

## 2019-02-18 PROCEDURE — 85027 COMPLETE CBC AUTOMATED: CPT | Performed by: ADVANCED PRACTICE MIDWIFE

## 2019-02-18 PROCEDURE — 36415 COLL VENOUS BLD VENIPUNCTURE: CPT | Performed by: ADVANCED PRACTICE MIDWIFE

## 2019-02-18 PROCEDURE — 99396 PREV VISIT EST AGE 40-64: CPT | Performed by: ADVANCED PRACTICE MIDWIFE

## 2019-02-18 PROCEDURE — 83036 HEMOGLOBIN GLYCOSYLATED A1C: CPT | Performed by: ADVANCED PRACTICE MIDWIFE

## 2019-02-18 ASSESSMENT — ANXIETY QUESTIONNAIRES
6. BECOMING EASILY ANNOYED OR IRRITABLE: SEVERAL DAYS
2. NOT BEING ABLE TO STOP OR CONTROL WORRYING: NOT AT ALL
3. WORRYING TOO MUCH ABOUT DIFFERENT THINGS: SEVERAL DAYS
GAD7 TOTAL SCORE: 3
1. FEELING NERVOUS, ANXIOUS, OR ON EDGE: NOT AT ALL
5. BEING SO RESTLESS THAT IT IS HARD TO SIT STILL: SEVERAL DAYS
7. FEELING AFRAID AS IF SOMETHING AWFUL MIGHT HAPPEN: NOT AT ALL
IF YOU CHECKED OFF ANY PROBLEMS ON THIS QUESTIONNAIRE, HOW DIFFICULT HAVE THESE PROBLEMS MADE IT FOR YOU TO DO YOUR WORK, TAKE CARE OF THINGS AT HOME, OR GET ALONG WITH OTHER PEOPLE: NOT DIFFICULT AT ALL

## 2019-02-18 ASSESSMENT — PATIENT HEALTH QUESTIONNAIRE - PHQ9: 5. POOR APPETITE OR OVEREATING: NOT AT ALL

## 2019-02-18 NOTE — PATIENT INSTRUCTIONS
Preventive Health Recommendations  Female Ages 40-50    Yearly exam:     See your health care provider every year in order to  1. Review health changes   2. Discuss preventive care    3. Review your medicines if your provider prescribed any      Get a Pap test every five years with co-testing for HPV (unless you have an abnormal result and your provider advises testing more often)       You do not need a Pap test if your uterus was removed (hysterectomy) and you have not had cancer      You should be tested each year for STD's (sexually transmitted diseases) if you are at risk      Talk with your provider about starting mammogram screenings for breast cancer and how often you should be screened      Have a cholesterol test every 3-5 years       Have a diabetes test (fasting glucose) after age 45. If you are at risk for diabetes, you should have this test every 3 years      You may begin to experience some changes in your menstrual cycle as you age, you may also begin to have some symptoms of perimenopause such as hot flashes. Women typically go through menopause anytime between 45-55 years of age.    Shots: Get a flu shot each year. Get a tetanus shot every 10 years.     Nutrition:     Eat at least 5 servings of fruits and vegetables each day    Eat REAL food, stay away from processed foods    Eat whole-grain bread, whole-wheat pasta and brown rice instead of white grains and rice    For bone health:  Eat calcium-rich foods or take calcium pills (500 to 600 mg) twice a day with food (1200 mg per day). Also take vitamin D (1,000-3,000 IUs) each day.     Lifestyle    Exercise at least 150 minutes a week (an average of 30 minutes a day, 5 days a week). This will help you control your weight and prevent disease.    Limit alcohol to one drink per day    No smoking     Wear sunscreen to prevent skin cancer    See your dentist every six months for an exam and cleaning    Weight bearing exercise to encourage good bone  health prevent osteoporosis      Intrauterine Device (IUD)     What to watch for right after IUD placement:      Some women may experience uterine cramps, bleeding, and/or dizziness during and right after IUD placement       To help minimize the cramps, you may take ibuprofen 800 mg with food prior to your appointment. These symptoms should improve over the next 24 hours.  Mild cramping may be present for a few days after IUD placement. Please continue taking the ibuprofen 800 mg with food three times a day for the next five days.      You may experience spotting or bleeding for the first few weeks after IUD placement      Use condoms or abstain from sex for 7 days after the insertion of your Mirena or Fatou IUD      If you experience fever, abdominal pain, worsening pelvic pain, dizziness, unusually heavy vaginal bleeding, suspected expulsion of device or four smelling vaginal discharge please come to the clinic for evaluation      Please schedule an appointment at the clinic for a string check 4-6 weeks after IUD placement    Your periods may change (Fatou/Mirena):      For the first 3 to 6 months, your monthly period may become irregular. You may also have frequent spotting or light bleeding. A few women have heavy bleeding during this time. After your body adjusts, the number of bleeding days is likely to decrease (but may remain irregular), and you may even find that your periods stop altogether for as long as Fatou/Mirena is in place.  Your periods will return rapidly once the IUD is removed.      Checking for your strings:      We encourage everyone with an IUD in place to check for their strings monthly      You may check your own IUD strings by inserting a finger into the vagina and feeling the strings as they exit the cervix.  The strings will initially feel firm, like fishing line, but will soften over a few weeks.  After the strings have softened, you or your partner should not be able to feel the  strings during intercourse.       If the string length greatly changes or if you cannot feel your strings at all please make an appointment to see you midwife and use a backup method of contraception like a condom.      If you can feel something hard/plastic like the IUD may not be in the correct place. You should then see your healthcare provider to have the position confirmed with ultrasound.       Remember:    IUD's do not protect against HIV or STIs.  IUD's do not prevent the formation of ovarian cysts.  IUD's do not typically reduce acne or cause weight gain or mood changes.    For more information:  [email or web address]      If you have questions or concerns please call:    Mundo Mendez  [phone]

## 2019-02-18 NOTE — TELEPHONE ENCOUNTER
Phone call to patient to discuss test results:  Results for KELSI STODDARD (MRN [id number]) as of 2/18/2019 15:55   Ref. Range 2/18/2019 12:27   Hemoglobin A1C Latest Ref Range: 0 - 5.6 % 5.8 (H)   WBC Latest Ref Range: 4.0 - 11.0 10e9/L 7.2   Hemoglobin Latest Ref Range: 11.7 - 15.7 g/dL 10.5 (L)   Hematocrit Latest Ref Range: 35.0 - 47.0 % 34.4 (L)   Platelet Count Latest Ref Range: 150 - 450 10e9/L 346   RBC Count Latest Ref Range: 3.8 - 5.2 10e12/L 4.75   MCV Latest Ref Range: 78 - 100 fl 72 (L)   MCH Latest Ref Range: 26.5 - 33.0 pg 22.1 (L)   MCHC Latest Ref Range: 31.5 - 36.5 g/dL 30.5 (L)   RDW Latest Ref Range: 10.0 - 15.0 % 17.2 (H)   Advised patient to start OTC ferrous gluconate PO BID with orange juice for anemia.  Informed patient of pre-diabetes and discussed prevention, treatment, risk factors and testing.  Offered referral for dietitician, which patient declined and stated that she would prefer to use her  that she had successfully used in the past. Recommend patient RTC in 3-6 months for follow up after she integrates lifestyle changes.  Patient asked if she should wait 3-6 months to schedule TVUS which I had ordered for menorrhagia; advised patient to not wait have ultrasound when she is able.  Patient agreed with this plan and declined any further questions.    Charleen Gordon, Student CNYUNIOR Johnson, TIARA

## 2019-02-18 NOTE — PROGRESS NOTES
"Keiko is a 39 year old  female who presents for annual exam.     Besides routine health maintenance, she has no other health concerns today .  HPI:  Patient reports that she has no other concerns.  Menses are regular q 28-30 days and \"heavy and long,\" lasting 9 days.   Menses flow: normal, heavy and with clots.    LMP 19  Using tubal ligation for contraception.    She is not currently considering pregnancy.    REPRODUCTIVE/GYNECOLOGIC HISTORY:  Keiko is sexually active with male partner(s) and is currently in monogamous relationship.   STI testing offered?  Declined  History of abnormal Pap smear:  Yes; had a colpo done about 10 yrs ago, PAPs have been normal ever since  SOCIAL HISTORY  Abuse: does not report having previously been physical or sexually abused.    Do you feel safe in your environment? YES    She  reports that  has never smoked. she has never used smokeless tobacco.    PHQ-2 Score:     PHQ-2 (  [employer]) 2019 3/6/2017   Q1: Little interest or pleasure in doing things 0 0   Q2: Feeling down, depressed or hopeless 0 0   PHQ-2 Score 0 0     SADIA-7 SCORE 2019   Total Score 3     Alcohol: reports drinking 1x month, drinks 2-3 drinks per occasion    HEALTH MAINTENANCE:  Cholesterol: (  Cholesterol   Date Value Ref Range Status   2017 142 <200 mg/dL Final   2016 159 <200 mg/dL Final    History of abnormal lipids: No  Mammo: 19 . History of abnormal Mammo: No.  Regular Self Breast Exams: Yes  TSH: (  TSH   Date Value Ref Range Status   10/22/2018 0.71 0.40 - 4.00 mU/L Final    )  Pap; (  Lab Results   Component Value Date    PAP NIL 2018    PAP NIL 2015    PAP NIL 2013    )  Immunizations up to date: no; Influenza offered and declined by patient    Health maintenance updated:  yes    HEALTHY HABITS  Eating habits: eats regular meals, follows a balanced nutrition diet and has inadequate calcium intake  Calcium/Vitamin D intake: source:  Some from " [place] milk   Exercise: How often do you exercise? None; plans to start exercise program  Have you had an eye exam in the last two years? YES  Do you routinely see the dentist once or twice yearly? YES      HISTORY:  Obstetric History       T4      L8     SAB0   TAB0   Ectopic0   Multiple0   Live Births4       # Outcome Date GA Lbr Jason/2nd Weight Sex Delivery Anes PTL Lv   4 Term 11    M -SEC   JESSICA   3 Term 09    F -SEC   JESSICA   2 Term 07    M -SEC   JESSICA      Complications: Cephalopelvic Disproportion   1 Term 97    M   N JESSICA        Past Medical History:   Diagnosis Date     History of cold sores      HPV in female 2008     Recurrent UTI      Past Surgical History:   Procedure Laterality Date     ABDOMEN SURGERY      3 Cesarian sections     GYN SURGERY  11    Tubal Ligation     GYN SURGERY  2009    Colposcopy     wisdom teeth[  1996     Family History   Problem Relation Age of Onset     Diabetes Mother 53        Type II Diabetes     Obesity Mother      Family History Negative Father      Gastrointestinal Disease Maternal Grandmother      Eye Disorder Sister         Glaucoma     Cancer Paternal Uncle 48        Pancrreatic cancer     Allergies Son         Milk and Soy     Coronary Artery Disease No family hx of      Cerebrovascular Disease No family hx of      Breast Cancer No family hx of      Colon Cancer No family hx of      Thyroid Disease No family hx of      Social History     Socioeconomic History     Marital status:      Spouse name: Not on file     Number of children: Not on file     Years of education: Not on file     Highest education level: Not on file   Social Needs     Financial resource strain: Not on file     Food insecurity - worry: Not on file     Food insecurity - inability: Not on file     Transportation needs - medical: Not on file     Transportation needs - non-medical: Not on file   Occupational History     Not on file    Tobacco Use     Smoking status: Never Smoker     Smokeless tobacco: Never Used   Substance and Sexual Activity     Alcohol use: Yes     Alcohol/week: 0.0 oz     Comment: 2/week     Drug use: No     Sexual activity: Yes     Partners: Male     Birth control/protection: Surgical   Other Topics Concern     Parent/sibling w/ CABG, MI or angioplasty before 65F 55M? No   Social History Narrative     Not on file       Current Outpatient Medications:      Aluminum Chloride (DRYSOL EX), , Disp: , Rfl:      cholecalciferol (VITAMIN D3) 1000 UNIT tablet, Take 1 tablet (1,000 Units) by mouth daily, Disp: 100 tablet, Rfl: 3     cholecalciferol (VITAMIN D3) 30824 UNITS capsule, Take 1 capsule (50,000 Units) by mouth once a week (Patient not taking: Reported on 6/18/2018), Disp: 10 capsule, Rfl: 0     desogestrel-ethinyl estradiol (APRI) 0.15-30 MG-MCG per tablet, Take 1 tablet by mouth daily, Disp: 84 tablet, Rfl: 4     Multiple Vitamin (MULTI-VITAMIN DAILY PO), , Disp: , Rfl:      UNKNOWN MED DOSAGE, , Disp: , Rfl:      valACYclovir (VALTREX) 500 MG tablet, Take 1 tablet (500 mg) by mouth 2 times daily as needed, Disp: 14 tablet, Rfl: 1     Allergies   Allergen Reactions     Gluten Meal      headache     Milk Protein      Stomach ache, protein       Past medical, surgical, social and family history were reviewed and updated in [facility].    ROS:   CONSTITUTIONAL: NEGATIVE for fever, chills, change in weight  INTEGUMENTARU/SKIN: NEGATIVE for worrisome rashes, moles or lesions  EYES: NEGATIVE for vision changes or irritation  ENT: NEGATIVE for ear, mouth and throat problems  RESP: NEGATIVE for significant cough or SOB  BREAST: NEGATIVE for masses, tenderness or discharge  CV: NEGATIVE for chest pain, palpitations or peripheral edema  GI: NEGATIVE for nausea, abdominal pain, heartburn, or change in bowel habits  : NEGATIVE for unusual urinary or vaginal symptoms. Periods are regular.  MUSCULOSKELETAL: NEGATIVE for significant  arthralgias or myalgia  NEURO: NEGATIVE for weakness, dizziness or paresthesias  PSYCHIATRIC: NEGATIVE for changes in mood or affect    PHYSICAL EXAM:  /72 (BP Location: Right arm, Cuff Size: Adult Regular)   Wt 86.2 kg (190 lb)   LMP 02/07/2019   BMI 33.66 kg/m      Constitutional: healthy, alert and no distress  Neck: symmetrical, thyroid normal size, no masses present, no lymphadenopathy present.   Cardiovascular: RRR, no murmurs present  Respiratory: breathing unlabored, lungs CTA bilaterally  Breast: normal without masses, tenderness or nipple discharge and no palpable axillary masses or adenopathy  Gastrointestinal: abdomen soft, non-tender, bowel sounds present  PELVIC EXAM:  Vulva: No lesions, no adenopathy, BUS WNL  Vagina: Moist, pink, discharge normal  well rugated, no lesions  Cervix:smooth, pink, no visible lesions  Uterus: Normal size, non-tender, mobile  Ovaries: No masses palpated  Rectal exam: deferred    ASSESSMENT:    ICD-10-CM    1. Encounter for annual routine gynecological examination Z01.419    2. Menorrhagia with regular cycle N92.0 CBC with platelets     US Pelvic Complete w Transvaginal   3. Obesity (BMI 30.0-34.9) E66.9 Hemoglobin A1c     PLAN:  1. CBC today for menorrhagia  2. TVUS ordered for menorrhagia  3. Discussed continuing COCs or Mirena IUD for menorrhagia; plan to revisit discussion after TVUS results  4. Hgb A1c to screen for diabetes due to risk factors of obesity and family history  5. RTC in 1 yr for annual gyn exam    COUNSELING:   Reviewed preventive health counseling, as reflected in patient instructions  Special attention given to:        Regular exercise; patient recently joined a gym       Healthy diet/nutrition       Immunizations    Declined: Influenza due to Concerns about side effects        reports that  has never smoked. she has never used smokeless tobacco.      YUNIOR Westfall, CNM

## 2019-02-19 ASSESSMENT — ANXIETY QUESTIONNAIRES: GAD7 TOTAL SCORE: 3

## 2019-04-30 ENCOUNTER — TELEPHONE (OUTPATIENT)
Dept: OBGYN | Facility: CLINIC | Age: 39
End: 2019-04-30

## 2019-04-30 DIAGNOSIS — B00.1 RECURRENT COLD SORES: ICD-10-CM

## 2019-04-30 RX ORDER — VALACYCLOVIR HYDROCHLORIDE 500 MG/1
500 TABLET, FILM COATED ORAL 2 TIMES DAILY PRN
Qty: 14 TABLET | Refills: 1 | Status: SHIPPED | OUTPATIENT
Start: 2019-04-30 | End: 2021-03-30

## 2019-04-30 NOTE — TELEPHONE ENCOUNTER
"Requested Prescriptions   Pending Prescriptions Disp Refills     valACYclovir (VALTREX) 500 MG tablet 14 tablet 1     Sig: Take 1 tablet (500 mg) by mouth 2 times daily as needed       Antivirals for Herpes Protocol Passed - 4/30/2019 10:42 AM        Passed - Patient is age 12 or older        Passed - Recent (12 mo) or future (30 days) visit within the authorizing provider's specialty     Patient had office visit in the last 12 months or has a visit in the next 30 days with authorizing provider or within the authorizing provider's specialty.  See \"Patient Info\" tab in inbasket, or \"Choose Columns\" in Meds & Orders section of the refill encounter.              Passed - Medication is active on med list        Passed - Normal serum creatinine on file in past 12 months     Recent Labs   Lab Test 10/22/18  1404   CR 0.75             Routed request to provider as this was previously rx'd by a different provider.        Evy Vidal RN    "

## 2019-04-30 NOTE — TELEPHONE ENCOUNTER
Reason for Call:  Keiko is requesting a refill for Valtrex. She hasn't had a refill for a few years, but is having a catarino sore now, and is out of previous Rx. She likes to keep it on hand for when she has an outbreak.    Keiko is requesting the refill from [facility].    Best phone number to reach pt at is: [phone]  Ok to leave a message with medical info? yes    Pharmacy preferred (if calling for a refill): [facility].    Lorene Castillo  Patient Representative

## 2019-12-20 ENCOUNTER — OFFICE VISIT (OUTPATIENT)
Dept: FAMILY MEDICINE | Facility: CLINIC | Age: 39
End: 2019-12-20
Payer: COMMERCIAL

## 2019-12-20 VITALS
HEART RATE: 98 BPM | DIASTOLIC BLOOD PRESSURE: 80 MMHG | OXYGEN SATURATION: 99 % | BODY MASS INDEX: 32.95 KG/M2 | WEIGHT: 186 LBS | TEMPERATURE: 97.9 F | SYSTOLIC BLOOD PRESSURE: 116 MMHG

## 2019-12-20 DIAGNOSIS — J01.90 ACUTE SINUSITIS WITH SYMPTOMS > 10 DAYS: Primary | ICD-10-CM

## 2019-12-20 PROCEDURE — 99213 OFFICE O/P EST LOW 20 MIN: CPT | Performed by: FAMILY MEDICINE

## 2019-12-20 NOTE — PROGRESS NOTES
Bryan Farah is a 39 year old female who presents to clinic today for the following health issues:      HPI   Acute Illness   Acute illness concerns: sinus  Onset: 2 weeks    Fever: no    Chills/Sweats: YES    Headache (location?): YES    Sinus Pressure:YES, maxillary    Conjunctivitis:  no    Ear Pain: YES: right    Rhinorrhea: no    Congestion: YES    Sore Throat: no     Cough: no    Wheeze: no    Decreased Appetite: YES - today    Nausea: YES    Vomiting: no    Diarrhea:  no    Dysuria/Freq.: no    Fatigue/Achiness: YES- fatigue    Sick/Strep Exposure: YES- daughter had URI, coworkers have had influenza and pneumonia     Therapies Tried and outcome: sudafed    Patient Active Problem List   Diagnosis     CARDIOVASCULAR SCREENING; LDL GOAL LESS THAN 160     BMI 30.0-30.9,adult     Plantar fasciitis     TMJ (temporomandibular joint syndrome)     Recurrent cold sores     Past Surgical History:   Procedure Laterality Date     ABDOMEN SURGERY      3 Cesarian sections     GYN SURGERY  8/23/11    Tubal Ligation     GYN SURGERY  2009    Colposcopy     wisdom teeth[  1996       Social History     Tobacco Use     Smoking status: Never Smoker     Smokeless tobacco: Never Used   Substance Use Topics     Alcohol use: Yes     Alcohol/week: 0.0 standard drinks     Comment: 2/week     Family History   Problem Relation Age of Onset     Diabetes Mother 53        Type II Diabetes     Obesity Mother      Family History Negative Father      Gastrointestinal Disease Maternal Grandmother      Eye Disorder Sister         Glaucoma     Cancer Paternal Uncle 48        Pancrreatic cancer     Allergies Son         Milk and Soy     Coronary Artery Disease No family hx of      Cerebrovascular Disease No family hx of      Breast Cancer No family hx of      Colon Cancer No family hx of      Thyroid Disease No family hx of            Reviewed and updated as needed this visit by Provider  Tobacco  Allergies  Meds  Problems  Med  Hx  Surg Hx  Fam Hx         Review of Systems   ROS COMP: Constitutional, HEENT, cardiovascular, pulmonary, gi and gu systems are negative, except as otherwise noted.      Objective    /80   Pulse 98   Temp 97.9  F (36.6  C) (Oral)   Wt 84.4 kg (186 lb)   SpO2 99%   BMI 32.95 kg/m    Body mass index is 32.95 kg/m .  Physical Exam   GENERAL: healthy, alert and no distress  EYES: Eyes grossly normal to inspection, PERRL and conjunctivae and sclerae normal  HENT: normal cephalic/atraumatic, ear canals and TM's normal, nose and mouth without ulcers or lesions, oropharynx clear, oral mucous membranes moist and sinuses: maxillary, frontal tenderness on both sides  NECK: no adenopathy and no asymmetry, masses, or scars  RESP: lungs clear to auscultation - no rales, rhonchi or wheezes  CV: regular rate and rhythm, normal S1 S2, no S3 or S4, no murmur, click or rub, no peripheral edema and peripheral pulses strong  MS: no gross musculoskeletal defects noted, no edema  SKIN: no suspicious lesions or rashes    Diagnostic Test Results:  none         Assessment & Plan     1. Acute sinusitis with symptoms > 10 days: start antibiotic, continue symptomatic treatments, push fluids. Follow up in 2 weeks if symptoms not improving.  - amoxicillin-clavulanate (AUGMENTIN) 875-125 MG tablet; Take 1 tablet by mouth 2 times daily for 10 days  Dispense: 20 tablet; Refill: 0      Return in about 2 weeks (around 1/3/2020) for follow up if symptoms not improving.    Aaron Triplett,   [facility]

## 2020-02-11 ENCOUNTER — OFFICE VISIT (OUTPATIENT)
Dept: OBGYN | Facility: CLINIC | Age: 40
End: 2020-02-11
Payer: COMMERCIAL

## 2020-02-11 VITALS — SYSTOLIC BLOOD PRESSURE: 126 MMHG | WEIGHT: 186 LBS | DIASTOLIC BLOOD PRESSURE: 76 MMHG | BODY MASS INDEX: 32.95 KG/M2

## 2020-02-11 DIAGNOSIS — Z01.419 WOMEN'S ANNUAL ROUTINE GYNECOLOGICAL EXAMINATION: Primary | ICD-10-CM

## 2020-02-11 DIAGNOSIS — Z13.1 SCREENING FOR DIABETES MELLITUS: ICD-10-CM

## 2020-02-11 DIAGNOSIS — E73.9 LACTOSE INTOLERANCE: ICD-10-CM

## 2020-02-11 DIAGNOSIS — G89.29 CHRONIC BILATERAL LOW BACK PAIN, UNSPECIFIED WHETHER SCIATICA PRESENT: ICD-10-CM

## 2020-02-11 DIAGNOSIS — Z13.220 SCREENING FOR HYPERLIPIDEMIA: ICD-10-CM

## 2020-02-11 DIAGNOSIS — Z86.39 HISTORY OF VITAMIN D DEFICIENCY: ICD-10-CM

## 2020-02-11 DIAGNOSIS — Z12.39 SCREENING FOR BREAST CANCER: ICD-10-CM

## 2020-02-11 DIAGNOSIS — Z86.2 HISTORY OF ANEMIA: ICD-10-CM

## 2020-02-11 DIAGNOSIS — M54.50 CHRONIC BILATERAL LOW BACK PAIN, UNSPECIFIED WHETHER SCIATICA PRESENT: ICD-10-CM

## 2020-02-11 DIAGNOSIS — N92.0 MENORRHAGIA WITH REGULAR CYCLE: ICD-10-CM

## 2020-02-11 DIAGNOSIS — H91.93 DIFFICULTY HEARING, BILATERAL: ICD-10-CM

## 2020-02-11 DIAGNOSIS — R39.9 URINARY SYMPTOM OR SIGN: ICD-10-CM

## 2020-02-11 LAB
ALBUMIN UR-MCNC: NEGATIVE MG/DL
APPEARANCE UR: CLEAR
BACTERIA #/AREA URNS HPF: ABNORMAL /HPF
BILIRUB UR QL STRIP: NEGATIVE
COLOR UR AUTO: YELLOW
GLUCOSE UR STRIP-MCNC: NEGATIVE MG/DL
HGB UR QL STRIP: ABNORMAL
KETONES UR STRIP-MCNC: NEGATIVE MG/DL
LEUKOCYTE ESTERASE UR QL STRIP: NEGATIVE
NITRATE UR QL: NEGATIVE
NON-SQ EPI CELLS #/AREA URNS LPF: ABNORMAL /LPF
PH UR STRIP: 7 PH (ref 5–7)
RBC #/AREA URNS AUTO: ABNORMAL /HPF
SOURCE: ABNORMAL
SP GR UR STRIP: 1.01 (ref 1–1.03)
UROBILINOGEN UR STRIP-ACNC: 0.2 EU/DL (ref 0.2–1)
WBC #/AREA URNS AUTO: ABNORMAL /HPF

## 2020-02-11 PROCEDURE — 99396 PREV VISIT EST AGE 40-64: CPT | Performed by: ADVANCED PRACTICE MIDWIFE

## 2020-02-11 PROCEDURE — 81001 URINALYSIS AUTO W/SCOPE: CPT | Performed by: ADVANCED PRACTICE MIDWIFE

## 2020-02-11 PROCEDURE — 87086 URINE CULTURE/COLONY COUNT: CPT | Performed by: ADVANCED PRACTICE MIDWIFE

## 2020-02-11 PROCEDURE — 99213 OFFICE O/P EST LOW 20 MIN: CPT | Mod: 25 | Performed by: ADVANCED PRACTICE MIDWIFE

## 2020-02-11 RX ORDER — CHOLECALCIFEROL (VITAMIN D3) 125 MCG
3000 CAPSULE ORAL
Qty: 90 TABLET | Refills: 1 | Status: SHIPPED | OUTPATIENT
Start: 2020-02-11 | End: 2020-03-12

## 2020-02-11 NOTE — NURSING NOTE
"Chief Complaint   Patient presents with     Physical     UTI       Initial /76 (BP Location: Right arm, Cuff Size: Adult Regular)   Wt 84.4 kg (186 lb)   LMP 02/01/2020   BMI 32.95 kg/m   Estimated body mass index is 32.95 kg/m  as calculated from the following:    Height as of 3/22/18: 1.6 m (5' 3\").    Weight as of this encounter: 84.4 kg (186 lb).  BP completed using cuff size: regular    Questioned patient about current smoking habits.  Pt. has never smoked.    Mary Lou Gomez CMA           "

## 2020-02-11 NOTE — PROGRESS NOTES
Keiko is a 40 year old  female who presents for annual exam.     Besides routine health maintenance,  she would like to discuss heavy and long menses, difficulty hearing, and back ache.  HPI:  Keiko is a 40 year old, pleasant female who presents for her annual exam with multiple health issues. She reports a history of prolonged and heavy menses lasting up to 10 days, with 4-5 days of heavy bleeding. She had previously taken OC for treatment, but stopped taking them last year because of tingling in her right arm. She also was not able to consistently taken her prescribed OC every day. Since beginning a new job she is experiencing lower back pain. She has noticed some decreased bilateral hearing over the past few years, but denies ear pain or dizziness. She reports stomach pain, cramping, and diarrhea when eating diary.   Menses are regular q 28-30 days and normal, heavy and painful lasting 10 days.   Menses flow: normal, heavy and with clots.    Patient's last menstrual period was 2020..   Using tubal ligation for contraception.    She is not currently considering pregnancy.    REPRODUCTIVE/GYNECOLOGIC HISTORY:  Keiko is sexually active with male partner(s) and is currently in monogamous relationship.   STI testing offered?  Declined  History of abnormal Pap smear:  No  SOCIAL HISTORY  Abuse: does not report having previously been physical or sexually abused.    Do you feel safe in your environment? YES     She  reports that she has never smoked. She has never used smokeless tobacco.      Last PHQ-9 score on record = No flowsheet data found.  Last GAD7 score on record =   SADIA-7 SCORE 2019   Total Score 3     Alcohol Score = 0    HEALTH MAINTENANCE:  Cholesterol: (  Cholesterol   Date Value Ref Range Status   2017 142 <200 mg/dL Final   2016 159 <200 mg/dL Final    History of abnormal lipids: Yes  Mammo: yes . History of abnormal Mammo: No.  Regular Self Breast Exams: No  TSH: (  TSH    Date Value Ref Range Status   10/22/2018 0.71 0.40 - 4.00 mU/L Final    )  Pap; (  Lab Results   Component Value Date    PAP NIL 2018    PAP NIL 2015    PAP NIL 2013    )  Immunizations up to date: yes  (Gardasil, Tdap, Flu)  Health maintenance updated:  yes    HEALTHY HABITS  Eating habits: eats regular meals and has inadequate calcium intake  Calcium/Vitamin D intake: source:  Dark leafy greens Adequate? no  Exercise: How often do you exercise? 1-3 times/week;Walking  Have you had an eye exam in the last two years? NO  Do you routinely see the dentist once or twice yearly? YES       HISTORY:  OB History    Para Term  AB Living   4 4 4 0 0 8   SAB TAB Ectopic Multiple Live Births   0 0 0 0 4      # Outcome Date GA Lbr Jason/2nd Weight Sex Delivery Anes PTL Lv   4 Term 11    M -SEC   JESSICA   3 Term 09    F -SEC   JESSICA   2 Term 07    M -SEC   JESSICA      Complications: Cephalopelvic Disproportion   1 Term 97    M   N JESSICA     Past Medical History:   Diagnosis Date     History of cold sores      HPV in female 2008     Recurrent UTI      Past Surgical History:   Procedure Laterality Date     ABDOMEN SURGERY      3 Cesarian sections     GYN SURGERY  11    Tubal Ligation     GYN SURGERY  2009    Colposcopy     wisdom teeth[       Family History   Problem Relation Age of Onset     Diabetes Mother 53        Type II Diabetes     Obesity Mother      Family History Negative Father      Gastrointestinal Disease Maternal Grandmother      Eye Disorder Sister         Glaucoma     Cancer Paternal Uncle 48        Pancrreatic cancer     Allergies Son         Milk and Soy     Coronary Artery Disease No family hx of      Cerebrovascular Disease No family hx of      Breast Cancer No family hx of      Colon Cancer No family hx of      Thyroid Disease No family hx of      Social History     Socioeconomic History     Marital status:      Spouse  name: None     Number of children: None     Years of education: None     Highest education level: None   Occupational History     None   Social Needs     Financial resource strain: None     Food insecurity:     Worry: None     Inability: None     Transportation needs:     Medical: None     Non-medical: None   Tobacco Use     Smoking status: Never Smoker     Smokeless tobacco: Never Used   Substance and Sexual Activity     Alcohol use: Yes     Alcohol/week: 0.0 standard drinks     Comment: 2/week     Drug use: No     Sexual activity: Yes     Partners: Male     Birth control/protection: Surgical   Lifestyle     Physical activity:     Days per week: None     Minutes per session: None     Stress: None   Relationships     Social connections:     Talks on phone: None     Gets together: None     Attends Zoroastrianism service: None     Active member of club or organization: None     Attends meetings of clubs or organizations: None     Relationship status: None     Intimate partner violence:     Fear of current or ex partner: None     Emotionally abused: None     Physically abused: None     Forced sexual activity: None   Other Topics Concern     Parent/sibling w/ CABG, MI or angioplasty before 65F 55M? No   Social History Narrative     None       Current Outpatient Medications:      Aluminum Chloride (DRYSOL EX), , Disp: , Rfl:      cholecalciferol (VITAMIN D3) 1000 UNIT tablet, Take 1 tablet (1,000 Units) by mouth daily (Patient not taking: Reported on 2/18/2019), Disp: 100 tablet, Rfl: 3     cholecalciferol (VITAMIN D3) 59325 UNITS capsule, Take 1 capsule (50,000 Units) by mouth once a week (Patient not taking: Reported on 6/18/2018), Disp: 10 capsule, Rfl: 0     desogestrel-ethinyl estradiol (APRI) 0.15-30 MG-MCG per tablet, Take 1 tablet by mouth daily (Patient not taking: Reported on 2/18/2019), Disp: 84 tablet, Rfl: 4     Multiple Vitamin (MULTI-VITAMIN DAILY PO), , Disp: , Rfl:      UNKNOWN MED DOSAGE, , Disp: , Rfl:       valACYclovir (VALTREX) 500 MG tablet, Take 1 tablet (500 mg) by mouth 2 times daily as needed (for HSV outbreak) (Patient not taking: Reported on 12/20/2019), Disp: 14 tablet, Rfl: 1     Allergies   Allergen Reactions     Gluten Meal      headache     Milk Protein      Stomach ache, protein       Past medical, surgical, social and family history were reviewed and updated in [facility].    ROS:   CONSTITUTIONAL: NEGATIVE for fever, chills. Reports an increase in weight  INTEGUMENTARU/SKIN: NEGATIVE for worrisome rashes, moles or lesions  EYES: NEGATIVE for vision changes or irritation  ENT: NEGATIVE for mouth and throat problems. Reports decreased hearing in bilateral ears.  RESP: NEGATIVE for significant cough or SOB  BREAST: NEGATIVE for masses, tenderness or discharge  CV: NEGATIVE for chest pain, palpitations or peripheral edema  GI: NEGATIVE for nausea, abdominal pain, heartburn, or change in bowel habits. Reports abdominal pain and diarrhea after eating diary products.  : NEGATIVE for unusual urinary or vaginal symptoms. Periods are regular, heavy and prolonged.  MUSCULOSKELETAL: NEGATIVE for significant arthralgias. Reports chronic lower back pain.  NEURO: NEGATIVE for weakness, dizziness or paresthesias  PSYCHIATRIC: NEGATIVE for changes in mood or affect    PHYSICAL EXAM:  /76 (BP Location: Right arm, Cuff Size: Adult Regular)   Wt 84.4 kg (186 lb)   LMP 02/01/2020   BMI 32.95 kg/m     BMI: Body mass index is 32.95 kg/m .  Constitutional: healthy, alert and no distress  Neck: symmetrical, thyroid normal size, no masses present, no lymphadenopathy present.   ENT: Ear canals clear without discharge. Tympanic membranes normal in appearance.  Cardiovascular: RRR, no murmurs present  Respiratory: breathing unlabored, lungs CTA bilaterally  Musculoskeletal:  Curvature of cervical, thoracic, and lumbar spine are within normal limits.  No tenderness on palpation of spinous processes. Spinous process  midline.  Breast:normal without masses, tenderness or nipple discharge and no palpable axillary masses or adenopathy  Gastrointestinal: abdomen soft, non-tender, bowel sounds present  PELVIC EXAM:  Vulva: No lesions, no adenopathy, BUS WNL  Vagina: Moist, pink, discharge normal, no lesions  Cervix:smooth, no lesions  Uterus: Normal size, non-tender, mobile  Ovaries: No masses palpated  Rectal exam: deferred    ASSESSMENT/PLAN:      ICD-10-CM    1. Women's annual routine gynecological examination Z01.419    2. Urinary symptom or sign R39.9 *UA reflex to Microscopic and Culture ([place] [facility] (except [place] and [place])     Urine Microscopic     Urine Culture Aerobic Bacterial   3. Menorrhagia with regular cycle N92.0 US Pelvic Complete w Transvaginal   4. Chronic bilateral low back pain, unspecified whether sciatica present M54.5 [facility] RE[facility]    G89.29    5. History of anemia Z86.2 CBC with platelets   6. History of vitamin D deficiency Z86.39 **Vitamin D Deficiency FUTURE anytime   7. Screening for hyperlipidemia Z13.220 Lipid panel reflex to direct LDL Fasting   8. Difficulty hearing, bilateral H91.93    9. Screening for breast cancer Z12.39 *MA Screening Digital Bilateral   10. Lactose intolerance E73.9 lactase (LACTAID) 3000 UNIT tablet   11. Screening for diabetes mellitus Z13.1 Hemoglobin A1c     **Glucose FUTURE 1yr     1. Exam reassuring with no adverse findings. Return in 1 year for next annual.   2. U/A with culture sent to lab. Patient will be informed of results when available  3. Pelvic US ordered. Patient will be informed of findings when available.  4. Referral to chiropractor given.  5-7. Future labs ordered.   8. Hearing screen performed by MA with no adverse findings.  9. Mammogram ordered. Patient will be informed of results when available.  10. Prescription for Lactaid sent to pharmacy of choice. Patient encouraged to increase intake of dark leafy  greens, or fortified food products.  11. Future labs ordered. Patient encouraged to return when fasting for screening labs.    COUNSELING:   Reviewed preventive health counseling, as reflected in patient instructions  Special attention given to:        Regular exercise       Healthy diet/nutrition       Hearing screening       Osteoporosis Prevention/Bone Health    Patient encouraged to return in 2 weeks to go over lab and imaging results.    YUNIOR Westfall, CNM

## 2020-02-12 LAB
BACTERIA SPEC CULT: NO GROWTH
Lab: NORMAL
SPECIMEN SOURCE: NORMAL

## 2020-02-14 DIAGNOSIS — Z86.39 HISTORY OF VITAMIN D DEFICIENCY: ICD-10-CM

## 2020-02-14 DIAGNOSIS — Z13.1 SCREENING FOR DIABETES MELLITUS: ICD-10-CM

## 2020-02-14 DIAGNOSIS — Z86.2 HISTORY OF ANEMIA: ICD-10-CM

## 2020-02-14 DIAGNOSIS — Z13.220 SCREENING FOR HYPERLIPIDEMIA: ICD-10-CM

## 2020-02-14 LAB
CHOLEST SERPL-MCNC: 164 MG/DL
ERYTHROCYTE [DISTWIDTH] IN BLOOD BY AUTOMATED COUNT: 16.5 % (ref 10–15)
GLUCOSE SERPL-MCNC: 104 MG/DL (ref 70–99)
HBA1C MFR BLD: 5.6 % (ref 0–5.6)
HCT VFR BLD AUTO: 34 % (ref 35–47)
HDLC SERPL-MCNC: 44 MG/DL
HGB BLD-MCNC: 10.7 G/DL (ref 11.7–15.7)
LDLC SERPL CALC-MCNC: 97 MG/DL
MCH RBC QN AUTO: 22.6 PG (ref 26.5–33)
MCHC RBC AUTO-ENTMCNC: 31.5 G/DL (ref 31.5–36.5)
MCV RBC AUTO: 72 FL (ref 78–100)
NONHDLC SERPL-MCNC: 120 MG/DL
PLATELET # BLD AUTO: 332 10E9/L (ref 150–450)
RBC # BLD AUTO: 4.74 10E12/L (ref 3.8–5.2)
TRIGL SERPL-MCNC: 114 MG/DL
WBC # BLD AUTO: 8.6 10E9/L (ref 4–11)

## 2020-02-14 PROCEDURE — 80061 LIPID PANEL: CPT | Performed by: ADVANCED PRACTICE MIDWIFE

## 2020-02-14 PROCEDURE — 82306 VITAMIN D 25 HYDROXY: CPT | Performed by: ADVANCED PRACTICE MIDWIFE

## 2020-02-14 PROCEDURE — 36415 COLL VENOUS BLD VENIPUNCTURE: CPT | Performed by: ADVANCED PRACTICE MIDWIFE

## 2020-02-14 PROCEDURE — 82947 ASSAY GLUCOSE BLOOD QUANT: CPT | Performed by: ADVANCED PRACTICE MIDWIFE

## 2020-02-14 PROCEDURE — 83036 HEMOGLOBIN GLYCOSYLATED A1C: CPT | Performed by: ADVANCED PRACTICE MIDWIFE

## 2020-02-14 PROCEDURE — 85027 COMPLETE CBC AUTOMATED: CPT | Performed by: ADVANCED PRACTICE MIDWIFE

## 2020-02-16 LAB — DEPRECATED CALCIDIOL+CALCIFEROL SERPL-MC: 18 UG/L (ref 20–75)

## 2020-02-17 ENCOUNTER — HOSPITAL ENCOUNTER (OUTPATIENT)
Dept: MAMMOGRAPHY | Facility: CLINIC | Age: 40
Discharge: HOME | End: 2020-02-17
Attending: ADVANCED PRACTICE MIDWIFE | Admitting: ADVANCED PRACTICE MIDWIFE
Payer: COMMERCIAL

## 2020-02-17 ENCOUNTER — ANCILLARY PROCEDURE (OUTPATIENT)
Dept: ULTRASOUND IMAGING | Facility: CLINIC | Age: 40
End: 2020-02-17
Attending: ADVANCED PRACTICE MIDWIFE
Payer: COMMERCIAL

## 2020-02-17 DIAGNOSIS — Z12.39 SCREENING FOR BREAST CANCER: ICD-10-CM

## 2020-02-17 DIAGNOSIS — N92.0 MENORRHAGIA WITH REGULAR CYCLE: ICD-10-CM

## 2020-02-17 PROCEDURE — 76830 TRANSVAGINAL US NON-OB: CPT | Performed by: OBSTETRICS & GYNECOLOGY

## 2020-02-17 PROCEDURE — 76856 US EXAM PELVIC COMPLETE: CPT | Performed by: OBSTETRICS & GYNECOLOGY

## 2020-02-17 PROCEDURE — 77067 SCR MAMMO BI INCL CAD: CPT

## 2020-02-19 ENCOUNTER — TELEPHONE (OUTPATIENT)
Dept: OBGYN | Facility: CLINIC | Age: 40
End: 2020-02-19

## 2020-02-19 NOTE — TELEPHONE ENCOUNTER
Contacted patient to discuss lab and imaging results. Recommended patient to restart OTC vitamin D suppliments for low vitamin D and increase physical activity for low HDL. Discussed imaging results including treatment options. Patient declined restarting NENA and will make an appointment to discuss possible IUD or surgical intervention.    YUNIOR Westfall, CNM

## 2020-03-02 ENCOUNTER — HEALTH MAINTENANCE LETTER (OUTPATIENT)
Age: 40
End: 2020-03-02

## 2020-03-03 ENCOUNTER — OFFICE VISIT (OUTPATIENT)
Dept: OBGYN | Facility: CLINIC | Age: 40
End: 2020-03-03
Payer: COMMERCIAL

## 2020-03-03 VITALS — WEIGHT: 187 LBS | SYSTOLIC BLOOD PRESSURE: 114 MMHG | DIASTOLIC BLOOD PRESSURE: 74 MMHG | BODY MASS INDEX: 33.13 KG/M2

## 2020-03-03 DIAGNOSIS — N92.0 MENORRHAGIA WITH REGULAR CYCLE: Primary | ICD-10-CM

## 2020-03-03 PROCEDURE — 99213 OFFICE O/P EST LOW 20 MIN: CPT | Performed by: ADVANCED PRACTICE MIDWIFE

## 2020-03-03 RX ORDER — IBUPROFEN 800 MG/1
800 TABLET, FILM COATED ORAL EVERY 8 HOURS PRN
Qty: 16 TABLET | Refills: 1 | Status: SHIPPED | OUTPATIENT
Start: 2020-03-03 | End: 2021-03-30

## 2020-03-03 RX ORDER — MISOPROSTOL 100 UG/1
TABLET ORAL
Qty: 2 TABLET | Refills: 0 | Status: SHIPPED | OUTPATIENT
Start: 2020-03-03 | End: 2021-03-30

## 2020-03-03 RX ORDER — LORATADINE 10 MG/1
10 TABLET ORAL DAILY
COMMUNITY
End: 2024-02-22

## 2020-03-03 NOTE — PATIENT INSTRUCTIONS
Your Intrauterine Device (IUD)     What to watch for right after IUD placement:      Some women may experience uterine cramps, bleeding, and/or dizziness during and right after IUD placement       To help minimize the cramps, you may take ibuprofen 800 mg with food prior to your appointment. These symptoms should improve over the next 24 hours.  Mild cramping may be present for a few days after IUD placement. Please continue taking the ibuprofen 800 mg with food three times a day for the next five days.      You may experience spotting or bleeding for the first few weeks after IUD placement      Use condoms or abstain from sex for 7 days after the insertion of your Mirena or Fatou IUD      If you experience fever, abdominal pain, worsening pelvic pain, dizziness, unusually heavy vaginal bleeding, suspected expulsion of device or four smelling vaginal discharge please come to the clinic for evaluation      Please schedule an appointment at the clinic for a string check 4-6 weeks after IUD placement    Your periods may change (Fatou/Mirena):      For the first 3 to 6 months, your monthly period may become irregular. You may also have frequent spotting or light bleeding. A few women have heavy bleeding during this time. After your body adjusts, the number of bleeding days is likely to decrease (but may remain irregular), and you may even find that your periods stop altogether for as long as Fatou/Mirena is in place.  Your periods will return rapidly once the IUD is removed.      ParaGard IUD users:      ParaGard IUD users may experience heavier than normal cycles while their IUD is in place, this is considered normal     Back-up contraception is not needed                Checking for your strings:      We encourage everyone with an IUD in place to check for their strings monthly      You may check your own IUD strings by inserting a finger into the vagina and feeling the strings as they exit the cervix.  The strings  will initially feel firm, like fishing line, but will soften over a few weeks.  After the strings have softened, you or your partner should not be able to feel the strings during intercourse.       If the string length greatly changes or if you cannot feel your strings at all please make an appointment to see you midwife and use a backup method of contraception like a condom.      If you can feel something hard/plastic like the IUD may not be in the correct place. You should then see your healthcare provider to have the position confirmed with ultrasound.       Remember:    IUD's do not protect against HIV or STIs.  IUD's do not prevent the formation of ovarian cysts.  IUD's do not typically reduce acne or cause weight gain or mood changes.    For more information:  [email or web address]      If you have questions or concerns please call:    Mundo Mendez  [phone]

## 2020-03-03 NOTE — PROGRESS NOTES
SUBJECTIVE:                                                   Keiko Farah is a 40 year old who presents to clinic today for the following health issue(s):  Patient presents with:  Consult: Discuss IUD vs surgical for heavy menses      HPI:  Keiko is a pleasant 40 year old female who presents to discuss options for heavy menses. A recent pelvic US showed adenomyosis. Since her last appointment her symptoms have not changed and she would like to discuss risks and benefits of both surgical and hormone options.    Patient's last menstrual period was 2020.  Menstrual History: frequency: every 28-30 days  Patient is sexually active  .  Using tubal ligation for contraception.   Health maintenance updated:  yes  STI infx testing offered:  Declined    Last PHQ-9 score on record = No flowsheet data found.  Last GAD7 score on record =   SADIA-7 SCORE 2019   Total Score 3     Alcohol Score = 0    Problem list and histories reviewed & adjusted, as indicated.  Additional history: as documented.    Patient Active Problem List   Diagnosis     CARDIOVASCULAR SCREENING; LDL GOAL LESS THAN 160     BMI 30.0-30.9,adult     Plantar fasciitis     TMJ (temporomandibular joint syndrome)     Recurrent cold sores     Past Surgical History:   Procedure Laterality Date     ABDOMEN SURGERY      3 Cesarian sections     GYN SURGERY  10    Tubal Ligation     GYN SURGERY  2009    Colposcopy     wisdom teeth[        Social History     Tobacco Use     Smoking status: Never Smoker     Smokeless tobacco: Never Used   Substance Use Topics     Alcohol use: Yes     Alcohol/week: 0.0 standard drinks     Comment: 2/week      Problem (# of Occurrences) Relation (Name,Age of Onset)    Allergies (1) Son: Milk and Soy    Cancer (1) Paternal Uncle (48): Pancrreatic cancer    Cervical Cancer (2) Maternal Grandmother, Maternal Aunt    Diabetes (1) Mother (53): Type II Diabetes    Eye Disorder (1) Sister: Glaucoma    Family History  Negative (1) Father    Gastrointestinal Disease (1) Maternal Grandmother    Obesity (1) Mother       Negative family history of: Coronary Artery Disease, Cerebrovascular Disease, Breast Cancer, Colon Cancer, Thyroid Disease            Current Outpatient Medications   Medication Sig     ibuprofen (ADVIL/MOTRIN) 800 MG tablet Take 1 tablet (800 mg) by mouth every 8 hours as needed for moderate pain     lactase (LACTAID) 3000 UNIT tablet Take 1 tablet (3,000 Units) by mouth 3 times daily (with meals) for 90 doses     loratadine (CLARITIN) 10 MG tablet Take 10 mg by mouth daily     misoprostol (CYTOTEC) 100 MCG tablet Insert both tablets into vagina the night before your IUD insertion procedure.     Multiple Vitamin (MULTI-VITAMIN DAILY PO)      Aluminum Chloride (DRYSOL EX)      cholecalciferol (VITAMIN D3) 1000 UNIT tablet Take 1 tablet (1,000 Units) by mouth daily (Patient not taking: Reported on 2/18/2019)     cholecalciferol (VITAMIN D3) 23625 UNITS capsule Take 1 capsule (50,000 Units) by mouth once a week (Patient not taking: Reported on 6/18/2018)     desogestrel-ethinyl estradiol (APRI) 0.15-30 MG-MCG per tablet Take 1 tablet by mouth daily (Patient not taking: Reported on 2/18/2019)     UNKNOWN MED DOSAGE      valACYclovir (VALTREX) 500 MG tablet Take 1 tablet (500 mg) by mouth 2 times daily as needed (for HSV outbreak) (Patient not taking: Reported on 12/20/2019)     No current facility-administered medications for this visit.      Allergies   Allergen Reactions     Gluten Meal      headache     Milk Protein      Stomach ache, protein       ROS:  CONSTITUTIONAL: NEGATIVE for fever, chills, change in weight  GI: Continues to experience nausea, bloating, and abdominal pain when eating dairy, but Lactaid helps significantly with her symptoms.  : NEGATIVE for unusual urinary or vaginal symptoms. Periods are regular and heavy.  PSYCHIATRIC: NEGATIVE for changes in mood or affect    OBJECTIVE:     /74 (BP  Location: Right arm, Cuff Size: Adult Regular)   Wt 84.8 kg (187 lb)   LMP 02/27/2020   BMI 33.13 kg/m    Body mass index is 33.13 kg/m .    PHYSICAL EXAM:  Constitutional:  Appearance: Well nourished, well developed alert, in no acute distress  Skin: General Inspection:  No rashes present, no lesions present, no areas of discoloration.  Neurologic:  Mental Status:  Oriented X3.  Normal strength and tone, sensory exam grossly normal, mentation intact and speech normal.    Psychiatric:  Mentation appears normal and affect normal/bright.     In-Clinic Test Results:  No results found for this or any previous visit (from the past 24 hour(s)).    ASSESSMENT/PLAN:                                                        ICD-10-CM    1. Menorrhagia with regular cycle N92.0 misoprostol (CYTOTEC) 100 MCG tablet     ibuprofen (ADVIL/MOTRIN) 800 MG tablet       PLAN:    Risks and benefits of ablation, hysterectomy and hormone therapy discussed with patient. At this time patient would like to try IUD placement and will consider surgical options if her heavy menses continues. Risks and benefits of IUD discussed, patient educated on IUD placement procedure. Prescriptions for pre-medication sent to pharmacy of choice and patient educated on how to take them. Plan to insert Mirena IUD at next appointment.      15 minutes was spent face to face with the patient today discussing her history, diagnosis, and follow-up plan as noted above. Over 50% of the visit was spent in counseling and coordination of care.      YUNIOR Westfall, TIARA

## 2020-03-03 NOTE — NURSING NOTE
"Chief Complaint   Patient presents with     Consult     Discuss IUD vs surgical for heavy menses       Initial /74 (BP Location: Right arm, Cuff Size: Adult Regular)   Wt 84.8 kg (187 lb)   LMP 2020   BMI 33.13 kg/m   Estimated body mass index is 33.13 kg/m  as calculated from the following:    Height as of 3/22/18: 1.6 m (5' 3\").    Weight as of this encounter: 84.8 kg (187 lb).  BP completed using cuff size: regular    Questioned patient about current smoking habits.  Pt. has never smoked.          The following HM Due: NONE  Mary Lou Gomez CMA           "

## 2020-04-06 ENCOUNTER — OFFICE VISIT (OUTPATIENT)
Dept: OBGYN | Facility: CLINIC | Age: 40
End: 2020-04-06
Payer: COMMERCIAL

## 2020-04-06 VITALS — WEIGHT: 191.9 LBS | DIASTOLIC BLOOD PRESSURE: 72 MMHG | BODY MASS INDEX: 33.99 KG/M2 | SYSTOLIC BLOOD PRESSURE: 120 MMHG

## 2020-04-06 DIAGNOSIS — Z30.430 ENCOUNTER FOR INSERTION OF INTRAUTERINE CONTRACEPTIVE DEVICE: Primary | ICD-10-CM

## 2020-04-06 PROCEDURE — 87591 N.GONORRHOEAE DNA AMP PROB: CPT | Performed by: ADVANCED PRACTICE MIDWIFE

## 2020-04-06 PROCEDURE — 87491 CHLMYD TRACH DNA AMP PROBE: CPT | Performed by: ADVANCED PRACTICE MIDWIFE

## 2020-04-06 PROCEDURE — 58300 INSERT INTRAUTERINE DEVICE: CPT | Performed by: ADVANCED PRACTICE MIDWIFE

## 2020-04-06 NOTE — PATIENT INSTRUCTIONS
Your Intrauterine Device (IUD)     What to watch for right after IUD placement:      Some women may experience uterine cramps, bleeding, and/or dizziness during and right after IUD placement       To help minimize the cramps, you may take ibuprofen 800 mg with food prior to your appointment. These symptoms should improve over the next 24 hours.  Mild cramping may be present for a few days after IUD placement. Please continue taking the ibuprofen 800 mg with food three times a day for the next five days.      You may experience spotting or bleeding for the first few weeks after IUD placement      Use condoms or abstain from sex for 7 days after the insertion of your Mirena or Fatou IUD      If you experience fever, abdominal pain, worsening pelvic pain, dizziness, unusually heavy vaginal bleeding, suspected expulsion of device or four smelling vaginal discharge please come to the clinic for evaluation      Please schedule an appointment at the clinic for a string check 4-6 weeks after IUD placement    Your periods may change (Fatou/Mirena):      For the first 3 to 6 months, your monthly period may become irregular. You may also have frequent spotting or light bleeding. A few women have heavy bleeding during this time. After your body adjusts, the number of bleeding days is likely to decrease (but may remain irregular), and you may even find that your periods stop altogether for as long as Fatou/Mirena is in place.  Your periods will return rapidly once the IUD is removed.      ParaGard IUD users:      ParaGard IUD users may experience heavier than normal cycles while their IUD is in place, this is considered normal     Back-up contraception is not needed                Checking for your strings:      We encourage everyone with an IUD in place to check for their strings monthly      You may check your own IUD strings by inserting a finger into the vagina and feeling the strings as they exit the cervix.  The strings  will initially feel firm, like fishing line, but will soften over a few weeks.  After the strings have softened, you or your partner should not be able to feel the strings during intercourse.       If the string length greatly changes or if you cannot feel your strings at all please make an appointment to see you midwife and use a backup method of contraception like a condom.      If you can feel something hard/plastic like the IUD may not be in the correct place. You should then see your healthcare provider to have the position confirmed with ultrasound.       Remember:    IUD's do not protect against HIV or STIs.  IUD's do not prevent the formation of ovarian cysts.  IUD's do not typically reduce acne or cause weight gain or mood changes.    For more information:  [email or web address]      If you have questions or concerns please call:    Mundo Mendez  [phone]

## 2020-04-06 NOTE — NURSING NOTE
"Chief Complaint   Patient presents with     IUD     Mirena placement       Initial /72 (BP Location: Right arm, Cuff Size: Adult Regular)   Wt 87 kg (191 lb 14.4 oz)   LMP 2020   BMI 33.99 kg/m   Estimated body mass index is 33.99 kg/m  as calculated from the following:    Height as of 3/22/18: 1.6 m (5' 3\").    Weight as of this encounter: 87 kg (191 lb 14.4 oz).  BP completed using cuff size: regular    Questioned patient about current smoking habits.  Pt. has never smoked.          The following HM Due: NONE  Mary Lou Gomez CMA         "

## 2020-04-06 NOTE — PROGRESS NOTES
MIDWIFE IUD PLACEMENT NOTE    IUD type: Mirena  Lot #: [id number]  NDC#: 80625-600-09    HPI:   Keiko Farah is a 40 year old female here today for IUD insertion. She is having Mirena placed to assist with heavy menses  Patient's last menstrual period was 03/22/2020..  Today's pregnancy test - NA, as patient has had a tubal 8 years ago  Patient has premedicated with Ibuprofen 800 mgs  Patient did use Cytotec prior to IUD insertion  STD testing offered? accepted  Patient does not have any infections or cervicitis  Patient does not have history of liver problems or cancer.     Patient has been given written information.  I have reviewed the risks of the IUD including pregnancy, PID, life threatening infection, perforation, expulsion, cramping, changes in bleeding and ovarian cysts. Benefits of the IUD and alternative family planning methods have been discussed.  The probable mechanisms of action were covered, failure rates, spontaneous expulsion, the importance of checking the string monthly, as well as minor or nuisance side effects such as ovarian cysts, migraines, skin changes irregular and unpredictable bleeding in the first 6 months of use and bleeding patterns after the first 6 months.  The 's printed material was provided for her review.  Patients questions are answered.  Patient has verbalized understanding of risks and benefits and has signed the consent form.      Health maintenance updated:  yes    Allergies   Allergen Reactions     Gluten Meal      headache     Milk Protein      Stomach ache, protein     Current Outpatient Medications   Medication Sig Dispense Refill     ibuprofen (ADVIL/MOTRIN) 800 MG tablet Take 1 tablet (800 mg) by mouth every 8 hours as needed for moderate pain 16 tablet 1     levonorgestrel (MIRENA) 20 MCG/24HR IUD 1 each (20 mcg) by Intrauterine route once       misoprostol (CYTOTEC) 100 MCG tablet Insert both tablets into vagina the night before your IUD insertion  procedure. 2 tablet 0     Aluminum Chloride (DRYSOL EX)        cholecalciferol (VITAMIN D3) 1000 UNIT tablet Take 1 tablet (1,000 Units) by mouth daily (Patient not taking: Reported on 2/18/2019) 100 tablet 3     cholecalciferol (VITAMIN D3) 28654 UNITS capsule Take 1 capsule (50,000 Units) by mouth once a week (Patient not taking: Reported on 6/18/2018) 10 capsule 0     desogestrel-ethinyl estradiol (APRI) 0.15-30 MG-MCG per tablet Take 1 tablet by mouth daily (Patient not taking: Reported on 2/18/2019) 84 tablet 4     loratadine (CLARITIN) 10 MG tablet Take 10 mg by mouth daily       Multiple Vitamin (MULTI-VITAMIN DAILY PO)        UNKNOWN MED DOSAGE        valACYclovir (VALTREX) 500 MG tablet Take 1 tablet (500 mg) by mouth 2 times daily as needed (for HSV outbreak) (Patient not taking: Reported on 12/20/2019) 14 tablet 1      Past Medical History:   Diagnosis Date     History of cold sores      HPV in female 2008     Recurrent UTI      Family History   Problem Relation Age of Onset     Diabetes Mother 53        Type II Diabetes     Obesity Mother      Family History Negative Father      Gastrointestinal Disease Maternal Grandmother      Cervical Cancer Maternal Grandmother      Eye Disorder Sister         Glaucoma     Cancer Paternal Uncle 48        Pancrreatic cancer     Allergies Son         Milk and Soy     Cervical Cancer Maternal Aunt      Coronary Artery Disease No family hx of      Cerebrovascular Disease No family hx of      Breast Cancer No family hx of      Colon Cancer No family hx of      Thyroid Disease No family hx of      Social History     Socioeconomic History     Marital status:      Spouse name: Not on file     Number of children: Not on file     Years of education: Not on file     Highest education level: Not on file   Occupational History     Not on file   Social Needs     Financial resource strain: Not on file     Food insecurity     Worry: Not on file     Inability: Not on file      Transportation needs     Medical: Not on file     Non-medical: Not on file   Tobacco Use     Smoking status: Never Smoker     Smokeless tobacco: Never Used   Substance and Sexual Activity     Alcohol use: Yes     Alcohol/week: 0.0 standard drinks     Comment: 2/week     Drug use: No     Sexual activity: Yes     Partners: Male     Birth control/protection: Surgical   Lifestyle     Physical activity     Days per week: Not on file     Minutes per session: Not on file     Stress: Not on file   Relationships     Social connections     Talks on phone: Not on file     Gets together: Not on file     Attends [employer] service: Not on file     Active member of club or organization: Not on file     Attends meetings of clubs or organizations: Not on file     Relationship status: Not on file     Intimate partner violence     Fear of current or ex partner: Not on file     Emotionally abused: Not on file     Physically abused: Not on file     Forced sexual activity: Not on file   Other Topics Concern     Parent/sibling w/ CABG, MI or angioplasty before 65F 55M? No   Social History Narrative     Not on file     Past Surgical History:   Procedure Laterality Date     ABDOMEN SURGERY      3 Cesarian sections     GYN SURGERY  8/23/11    Tubal Ligation     GYN SURGERY  2009    Colposcopy     wisdom teeth[  1996       REVIEW OF SYSTEMS:  CONSTITUTIONAL: NEGATIVE for fever, chills, change in weight  GI: NEGATIVE for nausea, abdominal pain, heartburn, or change in bowel habits  : NEGATIVE for unusual urinary or vaginal symptoms. Periods are regular and heavy.  NEURO: NEGATIVE for weakness, dizziness or paresthesias  HEME/ALLERGY/IMMUNE: NEGATIVE for bleeding problems  PSYCHIATRIC: NEGATIVE for changes in mood or affect    EXAM:  /72 (BP Location: Right arm, Cuff Size: Adult Regular)   Wt 87 kg (191 lb 14.4 oz)   LMP 03/22/2020   BMI 33.99 kg/m      Exam:  Constitutional: healthy, alert and no distress  Respiratory: negative,  Percussion normal. Good diaphragmatic excursion.   Gastrointestinal: Abdomen soft, non-tender.  No masses, organomegaly  Psychiatric: mentation appears normal and affect normal/bright    PELVIC EXAM:  Vulva: No external lesions, BUS WNL  Vagina: Moist, pink, discharge normal  well rugated, no lesions  Cervix:, smooth, pink, no visible lesions, neg CMT  Uterus: Normal size, anteverted, non-tender, mobile  Ovaries: No mass, non-tender  Rectal exam: deferred      ASSESSMENT/ PLAN:    ICD-10-CM    1. Encounter for insertion of intrauterine contraceptive device  Z30.430 levonorgestrel (MIRENA) 20 MCG/24HR IUD     levonorgestrel (MIRENA) 20 MCG/24HR IUD 20 mcg     INSERTION INTRAUTERINE DEVICE         Procedure:  Uterus assessed for position and is anteverted.  Sterile speculum inserted.  Betadine prep of cervix done.  Tenaculum applied at 10 and 2 o'clock.  Uterine sounded to 7.5 cm's.  Cervical dilators not used.   IUD inserted in the usual fashion without difficulty.  Tenaculum removed with scant bleeding from the cervix.  Strings trimmed to 3.5 cm's.  Patient tolerated the procedure well        COUNSELING:  Verbal and written instructions given to patient regarding checking IUD strings.    Reviewed warning signs of fever, sharp/severe abdominal pain, reassured it can be normal to have menstrual like cramping after placement and spotting/light bleeding may last a few weeks after placement.    Reviewed with patient that the IUD needs to be replaced in 5 years, nothing in vagina for 2 days following placement of Mirena or Fatou IUD's.  Use b/u method for one week following IUD placement.    Follow up appointment in 4 weeks if unable to feel IUD strings    YUNIOR Westfall, CNM

## 2020-04-07 LAB
C TRACH DNA SPEC QL NAA+PROBE: NEGATIVE
N GONORRHOEA DNA SPEC QL NAA+PROBE: NEGATIVE
SPECIMEN SOURCE: NORMAL
SPECIMEN SOURCE: NORMAL

## 2020-12-20 ENCOUNTER — HEALTH MAINTENANCE LETTER (OUTPATIENT)
Age: 40
End: 2020-12-20

## 2021-02-19 ENCOUNTER — TELEPHONE (OUTPATIENT)
Dept: OBGYN | Facility: CLINIC | Age: 41
End: 2021-02-19

## 2021-02-19 ENCOUNTER — OFFICE VISIT (OUTPATIENT)
Dept: OBGYN | Facility: CLINIC | Age: 41
End: 2021-02-19
Payer: COMMERCIAL

## 2021-02-19 VITALS — BODY MASS INDEX: 35.07 KG/M2 | SYSTOLIC BLOOD PRESSURE: 120 MMHG | DIASTOLIC BLOOD PRESSURE: 72 MMHG | WEIGHT: 198 LBS

## 2021-02-19 DIAGNOSIS — N30.00 ACUTE CYSTITIS WITHOUT HEMATURIA: Primary | ICD-10-CM

## 2021-02-19 DIAGNOSIS — R10.2 PELVIC PAIN IN FEMALE: Primary | ICD-10-CM

## 2021-02-19 LAB
ALBUMIN UR-MCNC: NEGATIVE MG/DL
APPEARANCE UR: CLEAR
BACTERIA #/AREA URNS HPF: ABNORMAL /HPF
BILIRUB UR QL STRIP: NEGATIVE
COLOR UR AUTO: YELLOW
GLUCOSE UR STRIP-MCNC: NEGATIVE MG/DL
HGB UR QL STRIP: NEGATIVE
KETONES UR STRIP-MCNC: NEGATIVE MG/DL
LEUKOCYTE ESTERASE UR QL STRIP: NEGATIVE
NITRATE UR QL: NEGATIVE
PH UR STRIP: 7.5 PH (ref 5–7)
RBC #/AREA URNS AUTO: ABNORMAL /HPF
SOURCE: ABNORMAL
SP GR UR STRIP: 1.02 (ref 1–1.03)
UROBILINOGEN UR STRIP-ACNC: 1 EU/DL (ref 0.2–1)
WBC #/AREA URNS AUTO: ABNORMAL /HPF

## 2021-02-19 PROCEDURE — 99214 OFFICE O/P EST MOD 30 MIN: CPT | Performed by: ADVANCED PRACTICE MIDWIFE

## 2021-02-19 PROCEDURE — 87086 URINE CULTURE/COLONY COUNT: CPT | Performed by: ADVANCED PRACTICE MIDWIFE

## 2021-02-19 PROCEDURE — 81001 URINALYSIS AUTO W/SCOPE: CPT | Performed by: ADVANCED PRACTICE MIDWIFE

## 2021-02-19 RX ORDER — CIPROFLOXACIN 500 MG/1
500 TABLET, FILM COATED ORAL 2 TIMES DAILY
Qty: 14 TABLET | Refills: 0 | Status: SHIPPED | OUTPATIENT
Start: 2021-02-19 | End: 2021-03-30

## 2021-02-19 NOTE — TELEPHONE ENCOUNTER
Called patient and advised of moderate bacteria in her urine. Culture pending. As patient is symptomatic, will start on antibiotics now. Also advised increased fluids and rest. Call if symptoms persist or become worse.       YUNIOR Westfall, CNM

## 2021-02-19 NOTE — NURSING NOTE
"Chief Complaint   Patient presents with     Consult     c/o cramping and back pain, Mirena placed 20       Initial /72 (BP Location: Left arm, Cuff Size: Adult Regular)   Wt 89.8 kg (198 lb)   BMI 35.07 kg/m   Estimated body mass index is 35.07 kg/m  as calculated from the following:    Height as of 3/22/18: 1.6 m (5' 3\").    Weight as of this encounter: 89.8 kg (198 lb).  BP completed using cuff size: regular long    Questioned patient about current smoking habits.  Pt. has never smoked.          The following HM Due: NONE    Mary Lou Gomez CMA    "

## 2021-02-19 NOTE — PROGRESS NOTES
S: Keiko Farah   is a 41 year old  here for IUD check. She has been having some abdominal pain and some back pain and is unsure if the IUD could be causing this. The back pain had improved after she went to physical therapy, but now getting worse again.  She is very happy that her menses have stopped since having the IUD placed.  She has not checked for her strings in the past few weeks. She is also noting some urinary symptoms, urgency and frequency.     ROS: 10 point ROS neg other than the symptoms noted above in the HPI.    O: /72 (BP Location: Left arm, Cuff Size: Adult Regular)   Wt 89.8 kg (198 lb)   BMI 35.07 kg/m      Pelvic Exam:  Vulva: No external lesions, normal hair distribution, no adenopathy  Vagina: Moist, pink, no abnormal discharge, well rugated, no lesions  Cervix:  parous, smooth, pink, no visible lesions, IUD strings visualized at cervical os.  Uterus: Normal size, anteverted, non-tender, mobile  Ovaries: No mass, non-tender, mobile  Rectal exam: Deferred    ASSESSMENT:  1) IUD normally placed.         PLAN:    ICD-10-CM    1. Pelvic pain in female  R10.2 UA with Microscopic     Urine Culture Aerobic Bacterial     US Pelvic Complete with Transvaginal     1. Ultrasound pending to confirm proper position of IUD. Will advise patient of results when available. Advised patient to contact [facility] to resume therapy.  RTC when due for annual exam or suspected problems with her IUD such as abnormal bleeding, disappearance of the strings or feeling the IUD in her cervix or with any pain with intercourse, or abnormal discharge.  Encouraged patient to call with any questions or concerns.        YUNIOR Westfall, CNM

## 2021-02-20 LAB
BACTERIA SPEC CULT: NO GROWTH
Lab: NORMAL
SPECIMEN SOURCE: NORMAL

## 2021-03-30 ENCOUNTER — OFFICE VISIT (OUTPATIENT)
Dept: FAMILY MEDICINE | Facility: CLINIC | Age: 41
End: 2021-03-30
Payer: COMMERCIAL

## 2021-03-30 VITALS
BODY MASS INDEX: 35.26 KG/M2 | WEIGHT: 199 LBS | HEIGHT: 63 IN | SYSTOLIC BLOOD PRESSURE: 118 MMHG | HEART RATE: 118 BPM | TEMPERATURE: 97.2 F | DIASTOLIC BLOOD PRESSURE: 72 MMHG | OXYGEN SATURATION: 97 %

## 2021-03-30 DIAGNOSIS — N89.8 VAGINAL DISCHARGE: ICD-10-CM

## 2021-03-30 DIAGNOSIS — B00.1 RECURRENT COLD SORES: ICD-10-CM

## 2021-03-30 DIAGNOSIS — Z11.59 NEED FOR HEPATITIS C SCREENING TEST: ICD-10-CM

## 2021-03-30 DIAGNOSIS — Z11.4 SCREENING FOR HIV (HUMAN IMMUNODEFICIENCY VIRUS): ICD-10-CM

## 2021-03-30 DIAGNOSIS — Z12.4 SCREENING FOR MALIGNANT NEOPLASM OF CERVIX: ICD-10-CM

## 2021-03-30 DIAGNOSIS — E55.9 HYPOVITAMINOSIS D: ICD-10-CM

## 2021-03-30 DIAGNOSIS — Z00.00 ROUTINE GENERAL MEDICAL EXAMINATION AT A HEALTH CARE FACILITY: Primary | ICD-10-CM

## 2021-03-30 DIAGNOSIS — Z97.5 IUD (INTRAUTERINE DEVICE) IN PLACE: ICD-10-CM

## 2021-03-30 DIAGNOSIS — D64.9 ANEMIA, UNSPECIFIED TYPE: ICD-10-CM

## 2021-03-30 DIAGNOSIS — R79.89 ELEVATED LFTS: ICD-10-CM

## 2021-03-30 DIAGNOSIS — R73.01 IMPAIRED FASTING GLUCOSE: ICD-10-CM

## 2021-03-30 DIAGNOSIS — Z13.220 LIPID SCREENING: ICD-10-CM

## 2021-03-30 DIAGNOSIS — R73.03 PREDIABETES: ICD-10-CM

## 2021-03-30 DIAGNOSIS — Z13.29 SCREENING FOR THYROID DISORDER: ICD-10-CM

## 2021-03-30 LAB
ALBUMIN SERPL-MCNC: 3.5 G/DL (ref 3.4–5)
ALP SERPL-CCNC: 55 U/L (ref 40–150)
ALT SERPL W P-5'-P-CCNC: 63 U/L (ref 0–50)
ANION GAP SERPL CALCULATED.3IONS-SCNC: 5 MMOL/L (ref 3–14)
AST SERPL W P-5'-P-CCNC: 23 U/L (ref 0–45)
BILIRUB SERPL-MCNC: 0.3 MG/DL (ref 0.2–1.3)
BUN SERPL-MCNC: 9 MG/DL (ref 7–30)
CALCIUM SERPL-MCNC: 8.4 MG/DL (ref 8.5–10.1)
CHLORIDE SERPL-SCNC: 107 MMOL/L (ref 94–109)
CHOLEST SERPL-MCNC: 180 MG/DL
CO2 SERPL-SCNC: 26 MMOL/L (ref 20–32)
CREAT SERPL-MCNC: 0.63 MG/DL (ref 0.52–1.04)
DEPRECATED CALCIDIOL+CALCIFEROL SERPL-MC: 20 UG/L (ref 20–75)
ERYTHROCYTE [DISTWIDTH] IN BLOOD BY AUTOMATED COUNT: 14.9 % (ref 10–15)
GFR SERPL CREATININE-BSD FRML MDRD: >90 ML/MIN/{1.73_M2}
GLUCOSE SERPL-MCNC: 93 MG/DL (ref 70–99)
HBA1C MFR BLD: 5.7 % (ref 0–5.6)
HCT VFR BLD AUTO: 38.7 % (ref 35–47)
HCV AB SERPL QL IA: NONREACTIVE
HDLC SERPL-MCNC: 46 MG/DL
HGB BLD-MCNC: 12.5 G/DL (ref 11.7–15.7)
HIV 1+2 AB+HIV1 P24 AG SERPL QL IA: NONREACTIVE
LDLC SERPL CALC-MCNC: 114 MG/DL
MCH RBC QN AUTO: 25.5 PG (ref 26.5–33)
MCHC RBC AUTO-ENTMCNC: 32.3 G/DL (ref 31.5–36.5)
MCV RBC AUTO: 79 FL (ref 78–100)
NONHDLC SERPL-MCNC: 134 MG/DL
PLATELET # BLD AUTO: 315 10E9/L (ref 150–450)
POTASSIUM SERPL-SCNC: 4.4 MMOL/L (ref 3.4–5.3)
PROT SERPL-MCNC: 7.8 G/DL (ref 6.8–8.8)
RBC # BLD AUTO: 4.91 10E12/L (ref 3.8–5.2)
SODIUM SERPL-SCNC: 138 MMOL/L (ref 133–144)
SPECIMEN SOURCE: NORMAL
TRIGL SERPL-MCNC: 100 MG/DL
TSH SERPL DL<=0.005 MIU/L-ACNC: 0.74 MU/L (ref 0.4–4)
WBC # BLD AUTO: 9.1 10E9/L (ref 4–11)
WET PREP SPEC: NORMAL

## 2021-03-30 PROCEDURE — 99396 PREV VISIT EST AGE 40-64: CPT | Performed by: PHYSICIAN ASSISTANT

## 2021-03-30 PROCEDURE — 80053 COMPREHEN METABOLIC PANEL: CPT | Performed by: PHYSICIAN ASSISTANT

## 2021-03-30 PROCEDURE — G0145 SCR C/V CYTO,THINLAYER,RESCR: HCPCS | Performed by: PHYSICIAN ASSISTANT

## 2021-03-30 PROCEDURE — 85027 COMPLETE CBC AUTOMATED: CPT | Performed by: PHYSICIAN ASSISTANT

## 2021-03-30 PROCEDURE — 83036 HEMOGLOBIN GLYCOSYLATED A1C: CPT | Performed by: PHYSICIAN ASSISTANT

## 2021-03-30 PROCEDURE — 36415 COLL VENOUS BLD VENIPUNCTURE: CPT | Performed by: PHYSICIAN ASSISTANT

## 2021-03-30 PROCEDURE — 80061 LIPID PANEL: CPT | Performed by: PHYSICIAN ASSISTANT

## 2021-03-30 PROCEDURE — 87624 HPV HI-RISK TYP POOLED RSLT: CPT | Performed by: PHYSICIAN ASSISTANT

## 2021-03-30 PROCEDURE — 87389 HIV-1 AG W/HIV-1&-2 AB AG IA: CPT | Performed by: PHYSICIAN ASSISTANT

## 2021-03-30 PROCEDURE — 84443 ASSAY THYROID STIM HORMONE: CPT | Performed by: PHYSICIAN ASSISTANT

## 2021-03-30 PROCEDURE — 82306 VITAMIN D 25 HYDROXY: CPT | Performed by: PHYSICIAN ASSISTANT

## 2021-03-30 PROCEDURE — 87210 SMEAR WET MOUNT SALINE/INK: CPT | Performed by: PHYSICIAN ASSISTANT

## 2021-03-30 PROCEDURE — 86803 HEPATITIS C AB TEST: CPT | Performed by: PHYSICIAN ASSISTANT

## 2021-03-30 SDOH — HEALTH STABILITY: MENTAL HEALTH: HOW OFTEN DO YOU HAVE A DRINK CONTAINING ALCOHOL?: NOT ASKED

## 2021-03-30 SDOH — ECONOMIC STABILITY: INCOME INSECURITY: HOW HARD IS IT FOR YOU TO PAY FOR THE VERY BASICS LIKE FOOD, HOUSING, MEDICAL CARE, AND HEATING?: NOT ASKED

## 2021-03-30 SDOH — HEALTH STABILITY: MENTAL HEALTH: HOW OFTEN DO YOU HAVE 6 OR MORE DRINKS ON ONE OCCASION?: NOT ASKED

## 2021-03-30 SDOH — ECONOMIC STABILITY: FOOD INSECURITY: WITHIN THE PAST 12 MONTHS, YOU WORRIED THAT YOUR FOOD WOULD RUN OUT BEFORE YOU GOT MONEY TO BUY MORE.: NOT ASKED

## 2021-03-30 SDOH — ECONOMIC STABILITY: TRANSPORTATION INSECURITY
IN THE PAST 12 MONTHS, HAS LACK OF TRANSPORTATION KEPT YOU FROM MEETINGS, WORK, OR FROM GETTING THINGS NEEDED FOR DAILY LIVING?: NOT ASKED

## 2021-03-30 SDOH — ECONOMIC STABILITY: TRANSPORTATION INSECURITY
IN THE PAST 12 MONTHS, HAS THE LACK OF TRANSPORTATION KEPT YOU FROM MEDICAL APPOINTMENTS OR FROM GETTING MEDICATIONS?: NOT ASKED

## 2021-03-30 SDOH — HEALTH STABILITY: MENTAL HEALTH: HOW MANY STANDARD DRINKS CONTAINING ALCOHOL DO YOU HAVE ON A TYPICAL DAY?: NOT ASKED

## 2021-03-30 SDOH — ECONOMIC STABILITY: FOOD INSECURITY: WITHIN THE PAST 12 MONTHS, THE FOOD YOU BOUGHT JUST DIDN'T LAST AND YOU DIDN'T HAVE MONEY TO GET MORE.: NOT ASKED

## 2021-03-30 ASSESSMENT — MIFFLIN-ST. JEOR: SCORE: 1531.79

## 2021-03-30 NOTE — PROGRESS NOTES
SUBJECTIVE:   CC: Keiko Farah is an 41 year old woman who presents for preventive health visit.       Patient has been advised of split billing requirements and indicates understanding: Yes  Healthy Habits:    Do you get at least three servings of calcium containing foods daily (dairy, green leafy vegetables, etc.)? no, taking calcium and/or vitamin D supplement: no    Amount of exercise or daily activities, outside of work: will be starting soon    Problems taking medications regularly No    Medication side effects: No    Have you had an eye exam in the past two years? yes    Do you see a dentist twice per year? yes    Do you have sleep apnea, excessive snoring or daytime drowsiness?no      Today's PHQ-2 Score:   PHQ-2 ( 1999 [employer]) 3/30/2021 2/18/2019   Q1: Little interest or pleasure in doing things 0 0   Q2: Feeling down, depressed or hopeless 0 0   PHQ-2 Score 0 0     Gluten and dairy sensitivities  Patient reports that ingestion of gluten causes swelling in her body and this is palpable in her earlobes.  Elimination of this causes resolution of the symptom.  Dairy causes headaches and elimination of this has caused them to resolve.  Avoids these as much as she is able.    Cold sores  Does develop cold sores typically when she feels that she is deficient in food/nutrients.  Has noticed flares when she has been doing calorie limitations and does not achieve the amount of calories that she sets as a minimum expectation for herself.  She uses topical over-the-counter treatments and this works well for her.    Heavy periods/fatigue  Patient had an IUD placed (Mirena) April 2020 and reports that she is having only occasional dark spotting but no significant periods.  She occasionally needs to use a panty liner.  Her periods before the IUD were quite heavy and uncomfortable.  She has been anemic for as long as she can remember and had fatigue secondary to the anemia.  Her overall energy level has improved  significantly.    Abuse: Current or Past(Physical, Sexual or Emotional)- No  Do you feel safe in your environment? Yes    Have you ever done Advance Care Planning? (For example, a Health Directive, POLST, or a discussion with a medical provider or your loved ones about your wishes): No, advance care planning information given to patient to review.  Advanced care planning was discussed at today's visit.    Social History     Tobacco Use     Smoking status: Never Smoker     Smokeless tobacco: Never Used   Substance Use Topics     Alcohol use: Not Currently     Comment: history of rashes     If you drink alcohol do you typically have >3 drinks per day or >7 drinks per week? No                     Reviewed orders with patient.  Reviewed health maintenance and updated orders accordingly - Yes  BP Readings from Last 3 Encounters:   21 118/72   21 120/72   20 120/72    Wt Readings from Last 3 Encounters:   21 90.3 kg (199 lb)   21 89.8 kg (198 lb)   20 87 kg (191 lb 14.4 oz)                  Patient Active Problem List   Diagnosis     Plantar fasciitis     TMJ (temporomandibular joint syndrome)     Recurrent cold sores     IUD (intrauterine device) in place - 2020 - Mirena     Past Surgical History:   Procedure Laterality Date      SECTION      3 Cesarian sections     COLPOSCOPY  2009    Colposcopy     TUBAL LIGATION  2011    Tubal Ligation     wisdom teeth[  1996       Social History     Tobacco Use     Smoking status: Never Smoker     Smokeless tobacco: Never Used   Substance Use Topics     Alcohol use: Not Currently     Comment: history of rashes     Family History   Problem Relation Age of Onset     Diabetes Mother 53        Type II Diabetes     Obesity Mother      Family History Negative Father      Gastrointestinal Disease Maternal Grandmother      Cervical Cancer Maternal Grandmother      Pancreatic Cancer Paternal Uncle 48        Pancrreatic cancer      Allergies Son         Milk and Soy     Cervical Cancer Maternal Aunt      Rheumatoid Arthritis Maternal Aunt 23     Glaucoma Half-Sister      Coronary Artery Disease No family hx of      Cerebrovascular Disease No family hx of      Breast Cancer No family hx of      Colon Cancer No family hx of      Thyroid Disease No family hx of          Current Outpatient Medications   Medication Sig Dispense Refill     levonorgestrel (MIRENA) 20 MCG/24HR IUD 1 each (20 mcg) by Intrauterine route once       loratadine (CLARITIN) 10 MG tablet Take 10 mg by mouth daily       Multiple Vitamin (MULTI-VITAMIN DAILY PO)          FSH-7: No flowsheet data found.  click delete button to remove this line now  Mammogram Screening -patient electing biannual screenings.  No family history.    Pertinent mammograms are reviewed under the imaging tab.    Pertinent mammograms are reviewed under the imaging tab.  History of abnormal Pap smear: YES - updated in Problem List and Health Maintenance accordingly  PAP / HPV Latest Ref Rng & Units 2/13/2018 4/20/2015 4/20/2015   PAP - NIL - -   HPV 16 DNA NEG:Negative Negative Test canceled - Lab  error(A) Negative   HPV 18 DNA NEG:Negative Negative Test canceled - Lab  error(A) Negative   OTHER HR HPV NEG:Negative Negative Test canceled - Lab  error(A) Negative     Reviewed and updated as needed this visit by clinical staff  Tobacco  Allergies  Meds  Problems  Med Hx  Surg Hx  Fam Hx  Soc Hx          Reviewed and updated as needed this visit by Provider  Tobacco  Allergies   Problems  Med Hx  Surg Hx  Fam Hx  Soc Hx           ROS:  CONSTITUTIONAL: NEGATIVE for fever, chills, change in weight  INTEGUMENTARU/SKIN: NEGATIVE for worrisome rashes, moles or lesions  EYES: NEGATIVE for vision changes or irritation  ENT: NEGATIVE for ear, mouth and throat problems  RESP: NEGATIVE for significant cough or SOB  BREAST: NEGATIVE for masses, tenderness or discharge  CV:  "NEGATIVE for chest pain, palpitations or peripheral edema  GI: NEGATIVE for nausea, abdominal pain, heartburn, or change in bowel habits  : NEGATIVE for unusual urinary or vaginal symptoms. Periods are absent due to IUD  MUSCULOSKELETAL: NEGATIVE for significant arthralgias or myalgia  NEURO: NEGATIVE for weakness, dizziness or paresthesias  PSYCHIATRIC: NEGATIVE for changes in mood or affect    OBJECTIVE:   /72   Pulse 118   Temp 97.2  F (36.2  C) (Tympanic)   Ht 1.6 m (5' 3\")   Wt 90.3 kg (199 lb)   SpO2 97%   BMI 35.25 kg/m    EXAM:  GENERAL: healthy, alert and no distress  EYES: Eyes grossly normal to inspection, PERRL and conjunctivae and sclerae normal  HENT: ear canals and TM's normal, nose and mouth without ulcers or lesions  NECK: no adenopathy, no asymmetry, masses, or scars and thyroid normal to palpation  RESP: lungs clear to auscultation - no rales, rhonchi or wheezes  BREAST: normal without masses, tenderness or nipple discharge and no palpable axillary masses or adenopathy  CV: regular rate and rhythm, normal S1 S2, no S3 or S4, no murmur, click or rub, no peripheral edema and peripheral pulses strong  ABDOMEN: soft, nontender, no hepatosplenomegaly, no masses and bowel sounds normal   (female): normal female external genitalia, normal urethral meatus, vaginal mucosa pink, moist, well rugated, and normal cervix/adnexa/uterus -IUD strings visible, dark brown discharge in vaginal vault  MS: no gross musculoskeletal defects noted, no edema  SKIN: no suspicious lesions or rashes  NEURO: Normal strength and tone, mentation intact and speech normal  PSYCH: mentation appears normal, affect normal/bright    Diagnostic Test Results:  Results for orders placed or performed in visit on 03/30/21 (from the past 24 hour(s))   CBC with platelets   Result Value Ref Range    WBC 9.1 4.0 - 11.0 10e9/L    RBC Count 4.91 3.8 - 5.2 10e12/L    Hemoglobin 12.5 11.7 - 15.7 g/dL    Hematocrit 38.7 35.0 - 47.0 " %    MCV 79 78 - 100 fl    MCH 25.5 (L) 26.5 - 33.0 pg    MCHC 32.3 31.5 - 36.5 g/dL    RDW 14.9 10.0 - 15.0 %    Platelet Count 315 150 - 450 10e9/L   Hemoglobin A1c   Result Value Ref Range    Hemoglobin A1C 5.7 (H) 0 - 5.6 %   Wet prep    Specimen: Vagina   Result Value Ref Range    Specimen Description Vagina     Wet Prep No clue cells seen     Wet Prep No Trichomonas seen     Wet Prep No yeast seen     Wet Prep Many  WBC'S seen          ASSESSMENT/PLAN:   Keiko was seen today for physical.    Diagnoses and all orders for this visit:    Routine general medical examination at a health care facility  -     REVIEW OF HEALTH MAINTENANCE PROTOCOL ORDERS    IUD (intrauterine device) in place - 4/2020 - Mirena  Anemia, unspecified type  Anemia resolved with IUD placement  -     CBC with platelets    Hypovitaminosis D  Not currently taking a supplement.  Was prescribed 50,000 international units in 2019 but this was not covered by her insurance and she was not sure what to take over-the-counter.  Will advise once results are available.  -     Vitamin D Deficiency    Recurrent cold sores  Patient attributes this to dietary insufficiency.  We will continue to monitor.    Impaired fasting glucose  Noted historically.  We will recheck labs today.  Diet and exercise efforts encouraged.  -     Comprehensive metabolic panel (BMP + Alb, Alk Phos, ALT, AST, Total. Bili, TP)  -     Hemoglobin A1c    Vaginal discharge  No evidence of bacteria/yeast.  Suspect that this is old blood that has been unable to be removed from the vaginal vault.  Discussed sitz baths and some manual cleansing options.  Patient voiced understanding and agreement.  -     Wet prep    Screening for HIV (human immunodeficiency virus)  Routine screening  -     HIV Antigen Antibody Combo    Need for hepatitis C screening test  Routine screening  -     Hepatitis C Screen Reflex to HCV RNA Quant and Genotype    Screening for malignant neoplasm of cervix  Routine  "screening  -     Pap imaged thin layer screen with HPV - recommended age 30 - 65 years (select HPV order below)  -     HPV High Risk Types DNA Cervical    Screening for thyroid disorder  Routine screening  -     TSH with free T4 reflex    Lipid screening  Routine screening  -     Lipid panel reflex to direct LDL Fasting        Patient has been advised of split billing requirements and indicates understanding: Yes  COUNSELING:   Reviewed preventive health counseling, as reflected in patient instructions       Regular exercise       Healthy diet/nutrition       Consider Hep C screening for all patients one time for ages 18-79 years       HIV screeninx in teen years, 1x in adult years, and at intervals if high risk       Advance Care Planning    Estimated body mass index is 35.25 kg/m  as calculated from the following:    Height as of this encounter: 1.6 m (5' 3\").    Weight as of this encounter: 90.3 kg (199 lb).    Weight management plan: Discussed healthy diet and exercise guidelines    She reports that she has never smoked. She has never used smokeless tobacco.      Counseling Resources:  ATP IV Guidelines  Pooled Cohorts Equation Calculator  Breast Cancer Risk Calculator  BRCA-Related Cancer Risk Assessment: FHS-7 Tool  FRAX Risk Assessment  ICSI Preventive Guidelines  Dietary Guidelines for Americans,   USDA's MyPlate  ASA Prophylaxis  Lung CA Screening    Evy Patterson PA-C  [facility]  "

## 2021-03-31 PROBLEM — R79.89 ELEVATED LFTS: Status: ACTIVE | Noted: 2021-03-31

## 2021-03-31 PROBLEM — R73.03 PREDIABETES: Status: ACTIVE | Noted: 2021-03-31

## 2021-03-31 NOTE — RESULT ENCOUNTER NOTE
Keiko  I have reviewed your recent labs. Here are the results:    -Normal red blood cell (hgb) levels (nice improvement of your anemia with the IUD!), normal white blood cell count and normal platelet levels.  -LDL(bad) cholesterol level is elevated, HDL(good) cholesterol level is low which can increase your heart disease risk.  A diet high in fat and simple carbohydrates, genetics and being overweight can contribute to this. ADVISE: exercising 150 minutes of aerobic exercise per week (30 minutes for 5 days per week or 50 minutes for 3 days per week are options) and eating a low saturated fat/low carbohydrate diet are helpful to improve this. In 12 months, you should recheck your fasting cholesterol panel.  -Liver and gallbladder tests (ALT,AST, Alk phos,bilirubin) shows a very modest elevation of one of your liver function tests.  This is not to a concerning level.  I would recommend that you minimize/eliminate alcohol and use of Tylenol products and recheck this level in 3 to 6 months with a lab only appointment.  -Kidney function (GFR) is normal.  -Sodium is normal.  -Potassium is normal.  -Calcium is decreased.  ADVISE: getting more calcium in your diet and then rechecking this in 3 to 6 months.  -Your hemoglobin A1c is consistent with prediabetes.  Diet and exercise efforts are encouraged.  We will recheck this in 3 to 6 months.  -TSH (thyroid stimulating hormone) level is normal which indicates normal thyroid function.  -Hepatitis C antibody screen test shows no signs of a previous hepatitis C infection.  -HIV test is normal.  -Vitamin D level is low-normal and getting 5000 IU daily in your diet or supplements is recommended to increase this level.  This is available over-the-counter.  -No signs of bacteria or yeast vaginal infections.  For additional lab test information, labtestsonline.org is an excellent reference.      If you have any questions please do not hesitate to contact our office via phone  ([phone]) or [email or web address].    Evy Patterson, MS, PA-C  [facility]

## 2021-04-02 LAB
COPATH REPORT: NORMAL
PAP: NORMAL

## 2021-07-13 ENCOUNTER — OFFICE VISIT (OUTPATIENT)
Dept: FAMILY MEDICINE | Facility: CLINIC | Age: 41
End: 2021-07-13
Payer: COMMERCIAL

## 2021-07-13 VITALS
SYSTOLIC BLOOD PRESSURE: 112 MMHG | HEART RATE: 86 BPM | OXYGEN SATURATION: 98 % | TEMPERATURE: 97.6 F | HEIGHT: 63 IN | BODY MASS INDEX: 35.61 KG/M2 | WEIGHT: 201 LBS | DIASTOLIC BLOOD PRESSURE: 82 MMHG

## 2021-07-13 DIAGNOSIS — J31.0 CHRONIC RHINITIS: Primary | ICD-10-CM

## 2021-07-13 DIAGNOSIS — E66.01 MORBID OBESITY (H): ICD-10-CM

## 2021-07-13 PROCEDURE — 99213 OFFICE O/P EST LOW 20 MIN: CPT | Performed by: NURSE PRACTITIONER

## 2021-07-13 RX ORDER — FLUTICASONE PROPIONATE 50 MCG
1 SPRAY, SUSPENSION (ML) NASAL DAILY
Qty: 16 G | Refills: 1 | Status: SHIPPED | OUTPATIENT
Start: 2021-07-13

## 2021-07-13 ASSESSMENT — ENCOUNTER SYMPTOMS
COUGH: 0
DIARRHEA: 0
SORE THROAT: 0
HEADACHES: 0
RHINORRHEA: 1
ABDOMINAL PAIN: 0
VOMITING: 0
NECK PAIN: 0

## 2021-07-13 ASSESSMENT — MIFFLIN-ST. JEOR: SCORE: 1540.86

## 2021-07-13 NOTE — PROGRESS NOTES
Assessment & Plan     Chronic rhinitis  Trial Flonase discussed dosing at 2 sprays each nostril initially if symptoms improve can decrease to 1 spray each nostril daily.  Patient interested in getting allergy testing will let us know how effective nasal spray is if ongoing symptoms she can contact us for referral.  - fluticasone (FLONASE) 50 MCG/ACT nasal spray  Dispense: 16 g; Refill: 1    Morbid obesity (H)  Continue to work on diet and exercise habits.      Prescription drug management  I spent a total of 16 minutes on the day of the visit.   Time spent doing chart review, history and exam, documentation and further activities per the note       See Patient Instructions    No follow-ups on file.    Destinee Lopez CNP  [facility]    Bryan Aden is a 41 year old who presents for the following health issues     HPI     Acute Illness  Acute illness concerns: Ear pain  Onset/Duration: pain 4 1 wk.  Muffled sound x4 weeks  Symptoms:  Fever: no  Chills/Sweats: no  Headache (location?): no  Sinus Pressure: YES  Conjunctivitis:  no  Ear Pain: YES: left  Rhinorrhea: YES  Congestion: YES  Sore Throat: no  Cough: no  Wheeze: no  Decreased Appetite: no  Nausea: no  Vomiting: no  Diarrhea: no  Dysuria/Freq.: no  Dysuria or Hematuria: no  Fatigue/Achiness: no  Sick/Strep Exposure: no  Therapies tried and outcome: None    Patient here today complaining of ear pain for 1 week, muffled sounds decreased hearing for 4 weeks.  Symptoms worse on the left side versus the right side.  Has also had some associated nasal congestion that feels like allergies.    Patient's children have several food allergies she wonders if that could be contributing versus seasonal allergies to her symptoms.  Review of Systems   Constitutional, HEENT, cardiovascular, pulmonary, GI, , musculoskeletal, neuro, skin, endocrine and psych systems are negative, except as otherwise noted.      Objective    /82   Pulse 86  "  Temp 97.6  F (36.4  C)   Ht 1.6 m (5' 3\")   Wt 91.2 kg (201 lb)   SpO2 98%   BMI 35.61 kg/m    Body mass index is 35.61 kg/m .  Physical Exam   GENERAL: healthy, alert and no distress  EYES: Eyes grossly normal to inspection, PERRL and conjunctivae and sclerae normal  HENT: ear canals and TM's normal, nose and mouth without ulcers or lesions  NECK: no adenopathy, no asymmetry, masses, or scars and thyroid normal to palpation  RESP: lungs clear to auscultation - no rales, rhonchi or wheezes  CV: regular rate and rhythm, normal S1 S2, no S3 or S4, no murmur, click or rub, no peripheral edema and peripheral pulses strong              SARA Rolon     [address] [address]  [email or web address]   Office: [phone]                 "

## 2021-10-03 ENCOUNTER — HEALTH MAINTENANCE LETTER (OUTPATIENT)
Age: 41
End: 2021-10-03

## 2022-02-24 ENCOUNTER — OFFICE VISIT (OUTPATIENT)
Dept: FAMILY MEDICINE | Facility: CLINIC | Age: 42
End: 2022-02-24
Payer: COMMERCIAL

## 2022-02-24 VITALS
HEART RATE: 103 BPM | TEMPERATURE: 97.1 F | HEIGHT: 63 IN | SYSTOLIC BLOOD PRESSURE: 120 MMHG | WEIGHT: 200 LBS | OXYGEN SATURATION: 98 % | RESPIRATION RATE: 18 BRPM | DIASTOLIC BLOOD PRESSURE: 72 MMHG | BODY MASS INDEX: 35.44 KG/M2

## 2022-02-24 DIAGNOSIS — R52 GENERALIZED PAIN: ICD-10-CM

## 2022-02-24 DIAGNOSIS — Z12.31 VISIT FOR SCREENING MAMMOGRAM: ICD-10-CM

## 2022-02-24 DIAGNOSIS — Z13.220 SCREENING FOR LIPID DISORDERS: ICD-10-CM

## 2022-02-24 DIAGNOSIS — Z86.2 HISTORY OF ANEMIA: ICD-10-CM

## 2022-02-24 DIAGNOSIS — Z00.00 ROUTINE GENERAL MEDICAL EXAMINATION AT A HEALTH CARE FACILITY: Primary | ICD-10-CM

## 2022-02-24 DIAGNOSIS — F43.22 ADJUSTMENT DISORDER WITH ANXIOUS MOOD: ICD-10-CM

## 2022-02-24 DIAGNOSIS — R73.03 PREDIABETES: ICD-10-CM

## 2022-02-24 DIAGNOSIS — E66.01 MORBID OBESITY (H): ICD-10-CM

## 2022-02-24 PROCEDURE — 99396 PREV VISIT EST AGE 40-64: CPT | Mod: 25 | Performed by: NURSE PRACTITIONER

## 2022-02-24 PROCEDURE — 99213 OFFICE O/P EST LOW 20 MIN: CPT | Mod: 25 | Performed by: NURSE PRACTITIONER

## 2022-02-24 PROCEDURE — 90715 TDAP VACCINE 7 YRS/> IM: CPT | Performed by: NURSE PRACTITIONER

## 2022-02-24 PROCEDURE — 90471 IMMUNIZATION ADMIN: CPT | Performed by: NURSE PRACTITIONER

## 2022-02-24 ASSESSMENT — ENCOUNTER SYMPTOMS
NERVOUS/ANXIOUS: 1
DIARRHEA: 0
PALPITATIONS: 0
COUGH: 0
CONSTIPATION: 0
HEMATOCHEZIA: 0
SORE THROAT: 0
SHORTNESS OF BREATH: 0
NAUSEA: 0
ARTHRALGIAS: 0
CHILLS: 0
FREQUENCY: 0
HEARTBURN: 0
HEADACHES: 0
EYE PAIN: 0
DIZZINESS: 1
JOINT SWELLING: 0
FEVER: 0
ABDOMINAL PAIN: 0
DYSURIA: 0
WEAKNESS: 0
PARESTHESIAS: 0
HEMATURIA: 0
MYALGIAS: 1

## 2022-02-24 ASSESSMENT — ANXIETY QUESTIONNAIRES
1. FEELING NERVOUS, ANXIOUS, OR ON EDGE: SEVERAL DAYS
5. BEING SO RESTLESS THAT IT IS HARD TO SIT STILL: MORE THAN HALF THE DAYS
3. WORRYING TOO MUCH ABOUT DIFFERENT THINGS: MORE THAN HALF THE DAYS
2. NOT BEING ABLE TO STOP OR CONTROL WORRYING: MORE THAN HALF THE DAYS
7. FEELING AFRAID AS IF SOMETHING AWFUL MIGHT HAPPEN: SEVERAL DAYS
IF YOU CHECKED OFF ANY PROBLEMS ON THIS QUESTIONNAIRE, HOW DIFFICULT HAVE THESE PROBLEMS MADE IT FOR YOU TO DO YOUR WORK, TAKE CARE OF THINGS AT HOME, OR GET ALONG WITH OTHER PEOPLE: SOMEWHAT DIFFICULT
GAD7 TOTAL SCORE: 12
6. BECOMING EASILY ANNOYED OR IRRITABLE: MORE THAN HALF THE DAYS

## 2022-02-24 ASSESSMENT — PATIENT HEALTH QUESTIONNAIRE - PHQ9
SUM OF ALL RESPONSES TO PHQ QUESTIONS 1-9: 7
5. POOR APPETITE OR OVEREATING: MORE THAN HALF THE DAYS

## 2022-02-24 NOTE — PROGRESS NOTES
SUBJECTIVE:   CC: Keiko Farah is an 42 year old woman who presents for preventive health visit.     Patient has been advised of split billing requirements and indicates understanding: Yes  Healthy Habits:     Getting at least 3 servings of Calcium per day:  Yes    Bi-annual eye exam:  Yes    Dental care twice a year:  Yes    Sleep apnea or symptoms of sleep apnea:  None    Diet:  Gluten-free/reduced    Frequency of exercise:  4-5 days/week    Duration of exercise:  15-30 minutes    Taking medications regularly:  Yes    Medication side effects:  Other    PHQ-2 Total Score: 1    Additional concerns today:  No    Exercise:  3-5 days per week at the gym. Started earlier this month.      Today's PHQ-2 Score:   PHQ-2 ( 1999 Pfizer) 2/24/2022   Q1: Little interest or pleasure in doing things 0   Q2: Feeling down, depressed or hopeless 1   PHQ-2 Score 1   PHQ-2 Total Score (12-17 Years)- Positive if 3 or more points; Administer PHQ-A if positive -   Q1: Little interest or pleasure in doing things Not at all   Q2: Feeling down, depressed or hopeless Several days   PHQ-2 Score 1       Abuse: Current or Past (Physical, Sexual or Emotional) - No  Do you feel safe in your environment? Yes        Social History     Tobacco Use     Smoking status: Never Smoker     Smokeless tobacco: Never Used   Substance Use Topics     Alcohol use: Not Currently     Comment: history of rashes     If you drink alcohol do you typically have >3 drinks per day or >7 drinks per week? No    Alcohol Use 2/24/2022   Prescreen: >3 drinks/day or >7 drinks/week? Not Applicable   Prescreen: >3 drinks/day or >7 drinks/week? -       Reviewed orders with patient.  Reviewed health maintenance and updated orders accordingly - Yes  BP Readings from Last 3 Encounters:   02/24/22 120/72   07/13/21 112/82   03/30/21 118/72    Wt Readings from Last 3 Encounters:   02/24/22 90.7 kg (200 lb)   07/13/21 91.2 kg (201 lb)   03/30/21 90.3 kg (199 lb)                   Patient Active Problem List   Diagnosis     Plantar fasciitis     TMJ (temporomandibular joint syndrome)     Recurrent cold sores     IUD (intrauterine device) in place - 2020 - Mirena     Prediabetes     Elevated LFTs     Abnormal Pap smear of cervix     Morbid obesity (H)     Past Surgical History:   Procedure Laterality Date      SECTION      3 Cesarian sections     COLPOSCOPY  2009    Colposcopy     TUBAL LIGATION  2011    Tubal Ligation     wisdom teeth[  1996       Social History     Tobacco Use     Smoking status: Never Smoker     Smokeless tobacco: Never Used   Substance Use Topics     Alcohol use: Not Currently     Comment: history of rashes     Family History   Problem Relation Age of Onset     Diabetes Mother 53        Type II Diabetes     Obesity Mother      Family History Negative Father      Gastrointestinal Disease Maternal Grandmother      Cervical Cancer Maternal Grandmother      Pancreatic Cancer Paternal Uncle 48        Pancrreatic cancer     Allergies Son         Milk and Soy     Cervical Cancer Maternal Aunt      Rheumatoid Arthritis Maternal Aunt 23     Glaucoma Half-Sister      Coronary Artery Disease No family hx of      Cerebrovascular Disease No family hx of      Breast Cancer No family hx of      Colon Cancer No family hx of      Thyroid Disease No family hx of          Current Outpatient Medications   Medication Sig Dispense Refill     levonorgestrel (MIRENA) 20 MCG/24HR IUD 1 each (20 mcg) by Intrauterine route once       fluticasone (FLONASE) 50 MCG/ACT nasal spray Spray 1 spray into both nostrils daily 16 g 1     loratadine (CLARITIN) 10 MG tablet Take 10 mg by mouth daily       Multiple Vitamin (MULTI-VITAMIN DAILY PO)        Vitamin D3 (CHOLECALCIFEROL) 125 MCG (5000 UT) tablet Take 1 tablet (125 mcg) by mouth daily         Breast Cancer Screening:    Breast CA Risk Assessment (FHS-7) 2022   Do you have a family history of breast, colon, or ovarian  cancer? No / Unknown       Mammogram Screening - Offered annual screening and updated Health Maintenance for mutual plan based on risk factor consideration    Pertinent mammograms are reviewed under the imaging tab.    History of abnormal Pap smear: NO - age 30-65 PAP every 5 years with negative HPV co-testing recommended  PAP / HPV Latest Ref Rng & Units 3/30/2021 2018 2015   PAP (Historical) - NIL NIL -   HPV16 NEG:Negative Negative Negative Test canceled - Lab  error(A)   HPV18 NEG:Negative Negative Negative Test canceled - Lab  error(A)   HRHPV NEG:Negative Negative Negative Test canceled - Lab  error(A)     Reviewed and updated as needed this visit by clinical staff   Tobacco  Allergies  Meds  Problems  Med Hx  Surg Hx  Fam Hx  Soc   Hx        Reviewed and updated as needed this visit by Provider     Meds             Past Medical History:   Diagnosis Date     Dairy product intolerance     headaches     Gluten-sensitive enteropathy     swelling when consuming gluten     History of cold sores      HPV in female 2008      Past Surgical History:   Procedure Laterality Date      SECTION      3 Cesarian sections     COLPOSCOPY  2009    Colposcopy     TUBAL LIGATION  2011    Tubal Ligation     wisdom teeth[  1996       Review of Systems   Constitutional: Negative for chills and fever.   HENT: Positive for ear pain. Negative for congestion, hearing loss and sore throat.    Eyes: Negative for pain and visual disturbance.   Respiratory: Negative for cough and shortness of breath.    Cardiovascular: Negative for chest pain, palpitations and peripheral edema.   Gastrointestinal: Negative for abdominal pain, constipation, diarrhea, heartburn, hematochezia and nausea.   Genitourinary: Negative for dysuria, frequency, genital sores, hematuria and urgency.   Musculoskeletal: Positive for myalgias. Negative for arthralgias and joint swelling.  "  Skin: Negative for rash.   Neurological: Positive for dizziness. Negative for weakness, headaches and paresthesias.   Psychiatric/Behavioral: Negative for mood changes. The patient is nervous/anxious.      Feels like she is developing anxiety.    Increased stress related to job.   Difficulty with shutting off thoughts.    Increase in crying.    14, 12, and 10  (children)  Children should be old enough to be home alone, but they had an episode of fighting.   Will be having tutors coming into home and trying to adjust her work schedule.     went to back to work this week after being home for 1.5 years.      Sleeping ok at night.  Isn't able to sleep in a normal bed.   Stiff and sore after sleeping.  Sleeps on couch with soft cushion.    +Family history of rheumatoid arthritis - aunt and sister.      Constant body pain.  Unsure if muscles or joints.  Has various tenderness points with massage from her .      SADIA-7 SCORE 2/18/2019 2/24/2022   Total Score 3 12     PHQ 2/24/2022   PHQ-9 Total Score 7   Q9: Thoughts of better off dead/self-harm past 2 weeks Not at all       OBJECTIVE:   /72   Pulse 103   Temp 97.1  F (36.2  C) (Tympanic)   Resp 18   Ht 1.6 m (5' 3\")   Wt 90.7 kg (200 lb)   SpO2 98%   BMI 35.43 kg/m    Physical Exam  GENERAL: healthy, alert and no distress  EYES: Eyes grossly normal to inspection, PERRL and conjunctivae and sclerae normal  HENT: ear canals and TM's normal, nose and mouth without ulcers or lesions  NECK: no adenopathy, no asymmetry, masses, or scars and thyroid normal to palpation  RESP: lungs clear to auscultation - no rales, rhonchi or wheezes  CV: regular rate and rhythm, normal S1 S2, no S3 or S4, no murmur, click or rub, no peripheral edema  ABDOMEN: soft, nontender, no hepatosplenomegaly, no masses and bowel sounds normal  MS: no gross musculoskeletal defects noted, no edema  SKIN: no suspicious lesions or rashes  NEURO: Normal strength and tone, mentation " "intact and speech normal  PSYCH: mentation appears normal, affect normal/bright      ASSESSMENT/PLAN:       Keiko was seen today for physical.    Diagnoses and all orders for this visit:    Routine general medical examination at a health care facility    Visit for screening mammogram  -     MA SCREENING DIGITAL BILAT - Future  (s+30); Future    Screening for lipid disorders  -     Lipid panel reflex to direct LDL Fasting; Future    Morbid obesity (H)  -     TSH with free T4 reflex; Future    History of anemia  -     CBC with platelets; Future    Prediabetes  -     Comprehensive metabolic panel (BMP + Alb, Alk Phos, ALT, AST, Total. Bili, TP); Future  -     Hemoglobin A1c; Future    Generalized pain  -     ESR: Erythrocyte sedimentation rate; Future  -     CRP, inflammation; Future  -     Anti Nuclear Susan IgG by IFA with Reflex; Future  -     Cyclic Citrullinated Peptide Antibody IgG; Future  -     Rheumatoid factor; Future    Adjustment disorder with anxious mood  Discussed therapy and medication as treatment options.  Declined medication at this time and is willing to proceed with therapy.   -     Adult Mental Health  Referral; Future    Other orders  -     TDAP VACCINE (Adacel, Boostrix)  [[id number]]      COUNSELING:  Reviewed preventive health counseling, as reflected in patient instructions    Estimated body mass index is 35.43 kg/m  as calculated from the following:    Height as of this encounter: 1.6 m (5' 3\").    Weight as of this encounter: 90.7 kg (200 lb).      She reports that she has never smoked. She has never used smokeless tobacco.      Counseling Resources:  ATP IV Guidelines  Pooled Cohorts Equation Calculator  Breast Cancer Risk Calculator  BRCA-Related Cancer Risk Assessment: FHS-7 Tool  FRAX Risk Assessment  ICSI Preventive Guidelines  Dietary Guidelines for Americans, 2010  USDA's MyPlate  ASA Prophylaxis  Lung CA Screening    Kelly Snell, YUNIOR [facility] [place]

## 2022-02-25 ASSESSMENT — ANXIETY QUESTIONNAIRES: GAD7 TOTAL SCORE: 12

## 2022-03-01 ENCOUNTER — ANCILLARY PROCEDURE (OUTPATIENT)
Dept: MAMMOGRAPHY | Facility: CLINIC | Age: 42
End: 2022-03-01
Payer: COMMERCIAL

## 2022-03-01 DIAGNOSIS — Z12.31 VISIT FOR SCREENING MAMMOGRAM: ICD-10-CM

## 2022-03-01 PROCEDURE — 77067 SCR MAMMO BI INCL CAD: CPT | Mod: TC | Performed by: RADIOLOGY

## 2022-03-01 PROCEDURE — 77063 BREAST TOMOSYNTHESIS BI: CPT | Mod: TC | Performed by: RADIOLOGY

## 2022-03-02 NOTE — RESULT ENCOUNTER NOTE
Dear Keiko,     -Mammogram was normal.  ADVISE: rechecking in 1 year.      Please send a [employer] message or call [phone]  if you have any questions.      YUNIOR Neville, CNP  [facility] [place]    If you have further questions about the interpretation of your labs, [email or web address] is a good website to check out for further information.

## 2022-03-09 ENCOUNTER — LAB (OUTPATIENT)
Dept: LAB | Facility: CLINIC | Age: 42
End: 2022-03-09
Payer: COMMERCIAL

## 2022-03-09 DIAGNOSIS — E66.01 MORBID OBESITY (H): ICD-10-CM

## 2022-03-09 DIAGNOSIS — R52 GENERALIZED PAIN: ICD-10-CM

## 2022-03-09 DIAGNOSIS — Z13.220 SCREENING FOR LIPID DISORDERS: ICD-10-CM

## 2022-03-09 DIAGNOSIS — R73.03 PREDIABETES: ICD-10-CM

## 2022-03-09 DIAGNOSIS — Z86.2 HISTORY OF ANEMIA: ICD-10-CM

## 2022-03-09 LAB
ALBUMIN SERPL-MCNC: 3.5 G/DL (ref 3.4–5)
ALP SERPL-CCNC: 51 U/L (ref 40–150)
ALT SERPL W P-5'-P-CCNC: 38 U/L (ref 0–50)
ANION GAP SERPL CALCULATED.3IONS-SCNC: 9 MMOL/L (ref 3–14)
AST SERPL W P-5'-P-CCNC: 12 U/L (ref 0–45)
BILIRUB SERPL-MCNC: 0.4 MG/DL (ref 0.2–1.3)
BUN SERPL-MCNC: 12 MG/DL (ref 7–30)
CALCIUM SERPL-MCNC: 9 MG/DL (ref 8.5–10.1)
CHLORIDE BLD-SCNC: 107 MMOL/L (ref 94–109)
CHOLEST SERPL-MCNC: 190 MG/DL
CO2 SERPL-SCNC: 22 MMOL/L (ref 20–32)
CREAT SERPL-MCNC: 0.71 MG/DL (ref 0.52–1.04)
CRP SERPL-MCNC: 3.5 MG/L (ref 0–8)
ERYTHROCYTE [DISTWIDTH] IN BLOOD BY AUTOMATED COUNT: 13.4 % (ref 10–15)
ERYTHROCYTE [SEDIMENTATION RATE] IN BLOOD BY WESTERGREN METHOD: 9 MM/HR (ref 0–20)
FASTING STATUS PATIENT QL REPORTED: YES
GFR SERPL CREATININE-BSD FRML MDRD: >90 ML/MIN/1.73M2
GLUCOSE BLD-MCNC: 108 MG/DL (ref 70–99)
HBA1C MFR BLD: 5.7 % (ref 0–5.6)
HCT VFR BLD AUTO: 42.6 % (ref 35–47)
HDLC SERPL-MCNC: 38 MG/DL
HGB BLD-MCNC: 14.2 G/DL (ref 11.7–15.7)
LDLC SERPL CALC-MCNC: 125 MG/DL
MCH RBC QN AUTO: 27.6 PG (ref 26.5–33)
MCHC RBC AUTO-ENTMCNC: 33.3 G/DL (ref 31.5–36.5)
MCV RBC AUTO: 83 FL (ref 78–100)
NONHDLC SERPL-MCNC: 152 MG/DL
PLATELET # BLD AUTO: 266 10E3/UL (ref 150–450)
POTASSIUM BLD-SCNC: 3.9 MMOL/L (ref 3.4–5.3)
PROT SERPL-MCNC: 7.9 G/DL (ref 6.8–8.8)
RBC # BLD AUTO: 5.14 10E6/UL (ref 3.8–5.2)
SODIUM SERPL-SCNC: 138 MMOL/L (ref 133–144)
TRIGL SERPL-MCNC: 133 MG/DL
TSH SERPL DL<=0.005 MIU/L-ACNC: 0.92 MU/L (ref 0.4–4)
WBC # BLD AUTO: 7.5 10E3/UL (ref 4–11)

## 2022-03-09 PROCEDURE — 85652 RBC SED RATE AUTOMATED: CPT

## 2022-03-09 PROCEDURE — 80053 COMPREHEN METABOLIC PANEL: CPT

## 2022-03-09 PROCEDURE — 80061 LIPID PANEL: CPT

## 2022-03-09 PROCEDURE — 86140 C-REACTIVE PROTEIN: CPT

## 2022-03-09 PROCEDURE — 36415 COLL VENOUS BLD VENIPUNCTURE: CPT

## 2022-03-09 PROCEDURE — 83036 HEMOGLOBIN GLYCOSYLATED A1C: CPT

## 2022-03-09 PROCEDURE — 86038 ANTINUCLEAR ANTIBODIES: CPT

## 2022-03-09 PROCEDURE — 86431 RHEUMATOID FACTOR QUANT: CPT

## 2022-03-09 PROCEDURE — 86200 CCP ANTIBODY: CPT

## 2022-03-09 PROCEDURE — 84443 ASSAY THYROID STIM HORMONE: CPT

## 2022-03-09 PROCEDURE — 85027 COMPLETE CBC AUTOMATED: CPT

## 2022-03-09 NOTE — RESULT ENCOUNTER NOTE
Deayaima Aden,     -Normal red blood cell (hgb) levels, normal white blood cell count and normal platelet levels.  -A1C (diabetic test) is elevated and a sign of pre-diabetes. 5.7 to 6.4% is the pre-diabetes range.   ADVISE: continue work on dietary and lifestyle modifications and rechecking this in 6-12 months.    -ESR (chronic inflammation) and CRP (acute inflammation) tests are both within normal ranges.      Please send a [employer] message or call [phone]  if you have any questions.      Kelly Snell, APRN, CNP  [facility]    If you have further questions about the interpretation of your labs, [email or web address] is a good website to check out for further information.

## 2022-03-10 LAB
ANA SER QL IF: NEGATIVE
CCP AB SER IA-ACNC: 1.5 U/ML
RHEUMATOID FACT SER NEPH-ACNC: <6 IU/ML

## 2022-03-11 NOTE — RESULT ENCOUNTER NOTE
Dear Keiko,     -LDL(bad) cholesterol level is slightly elevated, HDL(good) cholesterol level is low which can increase your heart disease risk.  A diet high in fat and simple carbohydrates, genetics and being overweight can contribute to this. ADVISE: exercising 150 minutes of aerobic exercise per week (30 minutes for 5 days per week or 50 minutes for 3 days per week are options) and eating a low saturated fat/low carbohydrate diet are helpful to improve this. In 12 months, you should recheck your fasting cholesterol panel.    -Liver and gallbladder tests (ALT,AST, Alk phos,bilirubin) are normal.  -Kidney function (GFR) is normal.  -Sodium is normal.  -Potassium is normal.  -Calcium is normal.    -Glucose is slightly elevated and may be a sign of early diabetes (prediabetes). ADVISE: eating a low carbohydrate diet, exercising, trying to lose weight (if necessary) and rechecking your glucose level in 12 months.    -TSH (thyroid stimulating hormone) level is normal which indicates normal thyroid function.    CRP (acute inflammation), CCP, DEMIAN, RF (Rheumatoid factor) tests were all normal and reassuring.        Please send a [employer] message or call [phone]  if you have any questions.      Kelly Snell, APRN, CNP  [facility] [place]    If you have further questions about the interpretation of your labs, [email or web address] is a good website to check out for further information.

## 2022-09-10 ENCOUNTER — HEALTH MAINTENANCE LETTER (OUTPATIENT)
Age: 42
End: 2022-09-10

## 2023-02-17 ENCOUNTER — OFFICE VISIT (OUTPATIENT)
Dept: FAMILY MEDICINE | Facility: CLINIC | Age: 43
End: 2023-02-17
Payer: COMMERCIAL

## 2023-02-17 VITALS
WEIGHT: 205 LBS | SYSTOLIC BLOOD PRESSURE: 110 MMHG | DIASTOLIC BLOOD PRESSURE: 80 MMHG | BODY MASS INDEX: 36.32 KG/M2 | RESPIRATION RATE: 16 BRPM | HEIGHT: 63 IN | OXYGEN SATURATION: 98 % | TEMPERATURE: 95.7 F | HEART RATE: 89 BPM

## 2023-02-17 DIAGNOSIS — Z13.1 SCREENING FOR DIABETES MELLITUS: ICD-10-CM

## 2023-02-17 DIAGNOSIS — Z86.2 HISTORY OF ANEMIA: ICD-10-CM

## 2023-02-17 DIAGNOSIS — Z00.00 ROUTINE GENERAL MEDICAL EXAMINATION AT A HEALTH CARE FACILITY: Primary | ICD-10-CM

## 2023-02-17 DIAGNOSIS — Z13.220 SCREENING FOR LIPID DISORDERS: ICD-10-CM

## 2023-02-17 DIAGNOSIS — Z13.21 ENCOUNTER FOR VITAMIN DEFICIENCY SCREENING: ICD-10-CM

## 2023-02-17 PROBLEM — E66.01 MORBID OBESITY (H): Status: RESOLVED | Noted: 2021-07-13 | Resolved: 2023-02-17

## 2023-02-17 PROBLEM — R73.03 PREDIABETES: Status: RESOLVED | Noted: 2021-03-31 | Resolved: 2023-02-17

## 2023-02-17 LAB
ALBUMIN SERPL BCG-MCNC: 4.3 G/DL (ref 3.5–5.2)
ALP SERPL-CCNC: 54 U/L (ref 35–104)
ALT SERPL W P-5'-P-CCNC: 32 U/L (ref 10–35)
ANION GAP SERPL CALCULATED.3IONS-SCNC: 10 MMOL/L (ref 7–15)
AST SERPL W P-5'-P-CCNC: 20 U/L (ref 10–35)
BILIRUB SERPL-MCNC: 0.3 MG/DL
BUN SERPL-MCNC: 14.3 MG/DL (ref 6–20)
CALCIUM SERPL-MCNC: 8.9 MG/DL (ref 8.6–10)
CHLORIDE SERPL-SCNC: 105 MMOL/L (ref 98–107)
CHOLEST SERPL-MCNC: 178 MG/DL
CREAT SERPL-MCNC: 0.64 MG/DL (ref 0.51–0.95)
DEPRECATED CALCIDIOL+CALCIFEROL SERPL-MC: 14 UG/L (ref 20–75)
DEPRECATED HCO3 PLAS-SCNC: 24 MMOL/L (ref 22–29)
ERYTHROCYTE [DISTWIDTH] IN BLOOD BY AUTOMATED COUNT: 13.2 % (ref 10–15)
GFR SERPL CREATININE-BSD FRML MDRD: >90 ML/MIN/1.73M2
GLUCOSE SERPL-MCNC: 109 MG/DL (ref 70–99)
HBA1C MFR BLD: 5.7 % (ref 0–5.6)
HCT VFR BLD AUTO: 42 % (ref 35–47)
HDLC SERPL-MCNC: 36 MG/DL
HGB BLD-MCNC: 14.1 G/DL (ref 11.7–15.7)
LDLC SERPL CALC-MCNC: 113 MG/DL
MCH RBC QN AUTO: 28.3 PG (ref 26.5–33)
MCHC RBC AUTO-ENTMCNC: 33.6 G/DL (ref 31.5–36.5)
MCV RBC AUTO: 84 FL (ref 78–100)
NONHDLC SERPL-MCNC: 142 MG/DL
PLATELET # BLD AUTO: 278 10E3/UL (ref 150–450)
POTASSIUM SERPL-SCNC: 4.1 MMOL/L (ref 3.4–5.3)
PROT SERPL-MCNC: 7.2 G/DL (ref 6.4–8.3)
RBC # BLD AUTO: 4.98 10E6/UL (ref 3.8–5.2)
SODIUM SERPL-SCNC: 139 MMOL/L (ref 136–145)
TRIGL SERPL-MCNC: 146 MG/DL
WBC # BLD AUTO: 7.4 10E3/UL (ref 4–11)

## 2023-02-17 PROCEDURE — 36415 COLL VENOUS BLD VENIPUNCTURE: CPT | Performed by: NURSE PRACTITIONER

## 2023-02-17 PROCEDURE — 80053 COMPREHEN METABOLIC PANEL: CPT | Performed by: NURSE PRACTITIONER

## 2023-02-17 PROCEDURE — 82306 VITAMIN D 25 HYDROXY: CPT | Performed by: NURSE PRACTITIONER

## 2023-02-17 PROCEDURE — 85027 COMPLETE CBC AUTOMATED: CPT | Performed by: NURSE PRACTITIONER

## 2023-02-17 PROCEDURE — 80061 LIPID PANEL: CPT | Performed by: NURSE PRACTITIONER

## 2023-02-17 PROCEDURE — 83036 HEMOGLOBIN GLYCOSYLATED A1C: CPT | Performed by: NURSE PRACTITIONER

## 2023-02-17 PROCEDURE — 99396 PREV VISIT EST AGE 40-64: CPT | Performed by: NURSE PRACTITIONER

## 2023-02-17 ASSESSMENT — ENCOUNTER SYMPTOMS
JOINT SWELLING: 0
HEMATURIA: 0
MYALGIAS: 0
CONSTIPATION: 0
DYSURIA: 0
SORE THROAT: 0
HEADACHES: 1
PALPITATIONS: 0
PARESTHESIAS: 0
CHILLS: 0
DIZZINESS: 0
DIARRHEA: 0
HEARTBURN: 0
WEAKNESS: 0
BREAST MASS: 0
ARTHRALGIAS: 1
NERVOUS/ANXIOUS: 1
COUGH: 0
SHORTNESS OF BREATH: 0
NAUSEA: 0
FREQUENCY: 1
FEVER: 0
HEMATOCHEZIA: 0
EYE PAIN: 1

## 2023-02-17 ASSESSMENT — ANXIETY QUESTIONNAIRES
GAD7 TOTAL SCORE: 6
IF YOU CHECKED OFF ANY PROBLEMS ON THIS QUESTIONNAIRE, HOW DIFFICULT HAVE THESE PROBLEMS MADE IT FOR YOU TO DO YOUR WORK, TAKE CARE OF THINGS AT HOME, OR GET ALONG WITH OTHER PEOPLE: SOMEWHAT DIFFICULT
1. FEELING NERVOUS, ANXIOUS, OR ON EDGE: MORE THAN HALF THE DAYS
6. BECOMING EASILY ANNOYED OR IRRITABLE: NOT AT ALL
5. BEING SO RESTLESS THAT IT IS HARD TO SIT STILL: NOT AT ALL
2. NOT BEING ABLE TO STOP OR CONTROL WORRYING: MORE THAN HALF THE DAYS
GAD7 TOTAL SCORE: 6
GAD7 TOTAL SCORE: 6
7. FEELING AFRAID AS IF SOMETHING AWFUL MIGHT HAPPEN: NOT AT ALL
7. FEELING AFRAID AS IF SOMETHING AWFUL MIGHT HAPPEN: NOT AT ALL
4. TROUBLE RELAXING: SEVERAL DAYS
3. WORRYING TOO MUCH ABOUT DIFFERENT THINGS: SEVERAL DAYS
8. IF YOU CHECKED OFF ANY PROBLEMS, HOW DIFFICULT HAVE THESE MADE IT FOR YOU TO DO YOUR WORK, TAKE CARE OF THINGS AT HOME, OR GET ALONG WITH OTHER PEOPLE?: SOMEWHAT DIFFICULT

## 2023-02-17 ASSESSMENT — PATIENT HEALTH QUESTIONNAIRE - PHQ9
10. IF YOU CHECKED OFF ANY PROBLEMS, HOW DIFFICULT HAVE THESE PROBLEMS MADE IT FOR YOU TO DO YOUR WORK, TAKE CARE OF THINGS AT HOME, OR GET ALONG WITH OTHER PEOPLE: NOT DIFFICULT AT ALL
SUM OF ALL RESPONSES TO PHQ QUESTIONS 1-9: 2
SUM OF ALL RESPONSES TO PHQ QUESTIONS 1-9: 2

## 2023-02-17 NOTE — RESULT ENCOUNTER NOTE
Frances Aden,     -Normal red blood cell (hgb) levels, normal white blood cell count and normal platelet levels.  -A1C (test of average blood sugars over the past 3 months) was slightly elevated and stable with last years value.        Please send a [employer] message or call [phone]  if you have any questions.      Kelly Snell, YUNIOR, CNP  [facility] [place]    If you have further questions about the interpretation of your labs, [email or web address] is a good website to check out for further information.

## 2023-02-17 NOTE — PROGRESS NOTES
SUBJECTIVE:   CC: Keiko is an 43 year old who presents for preventive health visit.   Patient has been advised of split billing requirements and indicates understanding: Yes  Healthy Habits:     Getting at least 3 servings of Calcium per day:  Yes    Bi-annual eye exam:  Yes    Dental care twice a year:  NO    Sleep apnea or symptoms of sleep apnea:  None    Diet:  Gluten-free/reduced    Frequency of exercise:  None    Taking medications regularly:  Not Applicable    Medication side effects:  Not applicable    PHQ-2 Total Score: 0    Additional concerns today:  Yes      Answers for HPI/ROS submitted by the patient on 2/17/2023  If you checked off any problems, how difficult have these problems made it for you to do your work, take care of things at home, or get along with other people?: Not difficult at all  PHQ9 TOTAL SCORE: 2  SADIA 7 TOTAL SCORE: 6      Social:  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 ages 25 and 23 (foster child), 15, 13 an 11      Today's PHQ-2 Score:   PHQ-2 ( 1999 Pfizer) 2/17/2023   Q1: Little interest or pleasure in doing things 0   Q2: Feeling down, depressed or hopeless 0   PHQ-2 Score 0   PHQ-2 Total Score (12-17 Years)- Positive if 3 or more points; Administer PHQ-A if positive -   Q1: Little interest or pleasure in doing things Not at all   Q2: Feeling down, depressed or hopeless Not at all   PHQ-2 Score 0           Social History     Tobacco Use     Smoking status: Never     Smokeless tobacco: Never   Substance Use Topics     Alcohol use: Not Currently     Comment: history of rashes     If you drink alcohol do you typically have >3 drinks per day or >7 drinks per week? No    Alcohol Use 2/17/2023   Prescreen: >3 drinks/day or >7 drinks/week? No   Prescreen: >3 drinks/day or >7 drinks/week? -       Reviewed orders with patient.  Reviewed health maintenance and updated orders accordingly - Yes  BP Readings from Last 3 Encounters:    23 110/80   22 120/72   21 112/82    Wt Readings from Last 3 Encounters:   23 93 kg (205 lb)   22 90.7 kg (200 lb)   21 91.2 kg (201 lb)                  Patient Active Problem List   Diagnosis     Plantar fasciitis     TMJ (temporomandibular joint syndrome)     Recurrent cold sores     IUD (intrauterine device) in place - 2020 - Mirena     Elevated LFTs     Abnormal Pap smear of cervix     Past Surgical History:   Procedure Laterality Date      SECTION      3 Cesarian sections     COLPOSCOPY  2009    Colposcopy     TUBAL LIGATION  2011    Tubal Ligation     wisdom teeth[  1996       Social History     Tobacco Use     Smoking status: Never     Smokeless tobacco: Never   Substance Use Topics     Alcohol use: Not Currently     Comment: history of rashes     Family History   Problem Relation Age of Onset     Diabetes Mother 53        Type II Diabetes     Obesity Mother      Family History Negative Father      Gastrointestinal Disease Maternal Grandmother      Cervical Cancer Maternal Grandmother      Pancreatic Cancer Paternal Uncle 48        Pancrreatic cancer     Allergies Son         Milk and Soy     Cervical Cancer Maternal Aunt      Rheumatoid Arthritis Maternal Aunt 23     Glaucoma Half-Sister      Coronary Artery Disease No family hx of      Cerebrovascular Disease No family hx of      Breast Cancer No family hx of      Colon Cancer No family hx of      Thyroid Disease No family hx of          Current Outpatient Medications   Medication Sig Dispense Refill     levonorgestrel (MIRENA) 20 MCG/24HR IUD 1 each (20 mcg) by Intrauterine route once       Multiple Vitamin (MULTI-VITAMIN DAILY PO)        fluticasone (FLONASE) 50 MCG/ACT nasal spray Spray 1 spray into both nostrils daily 16 g 1     loratadine (CLARITIN) 10 MG tablet Take 10 mg by mouth daily         Breast Cancer Screening:    Breast CA Risk Assessment (FHS-7) 2022 3/1/2022   Do you  have a family history of breast, colon, or ovarian cancer? No / Unknown No / Unknown         Mammogram Screening - Offered annual screening and updated Health Maintenance for mutual plan based on risk factor consideration  Plan every 2 year screening mammograms.    Pertinent mammograms are reviewed under the imaging tab.    History of abnormal Pap smear: YES - updated in Problem List and Health Maintenance accordingly  PAP / HPV Latest Ref Rng & Units 3/30/2021 2018 2015   PAP (Historical) - NIL NIL -   HPV16 NEG:Negative Negative Negative Test canceled - Lab  error(A)   HPV18 NEG:Negative Negative Negative Test canceled - Lab  error(A)   HRHPV NEG:Negative Negative Negative Test canceled - Lab  error(A)     Reviewed and updated as needed this visit by clinical staff     Meds              Reviewed and updated as needed this visit by Provider     Meds             Past Medical History:   Diagnosis Date     Dairy product intolerance     headaches     Gluten-sensitive enteropathy     swelling when consuming gluten     History of cold sores      HPV in female 2008      Past Surgical History:   Procedure Laterality Date      SECTION      3 Cesarian sections     COLPOSCOPY  2009    Colposcopy     TUBAL LIGATION  2011    Tubal Ligation     wisdom teeth[  1996       Review of Systems   Constitutional: Negative for chills and fever.   HENT: Positive for ear pain. Negative for congestion, hearing loss and sore throat.    Eyes: Positive for pain. Negative for visual disturbance.   Respiratory: Negative for cough and shortness of breath.    Cardiovascular: Negative for chest pain, palpitations and peripheral edema.   Gastrointestinal: Negative for constipation, diarrhea, heartburn, hematochezia and nausea.   Breasts:  Positive for tenderness. Negative for breast mass and discharge.   Genitourinary: Positive for frequency and vaginal bleeding. Negative  "for dysuria, genital sores, hematuria, pelvic pain, urgency and vaginal discharge.   Musculoskeletal: Positive for arthralgias. Negative for joint swelling and myalgias.   Skin: Negative for rash.   Neurological: Positive for headaches. Negative for dizziness, weakness and paresthesias.   Psychiatric/Behavioral: Negative for mood changes. The patient is nervous/anxious.           OBJECTIVE:   /80   Pulse 89   Temp (!) 95.7  F (35.4  C) (Tympanic)   Resp 16   Ht 1.6 m (5' 3\")   Wt 93 kg (205 lb)   SpO2 98%   BMI 36.31 kg/m       Physical Exam     GENERAL: healthy, alert and no distress  EYES: Eyes grossly normal to inspection, PERRL and conjunctivae and sclerae normal  HENT: ear canals and TM's normal, nose and mouth without ulcers or lesions  NECK: no adenopathy, no asymmetry, masses, or scars and thyroid normal to palpation  RESP: lungs clear to auscultation - no rales, rhonchi or wheezes  CV: regular rate and rhythm, normal S1 S2, no S3 or S4, no murmur, click or rub, no peripheral edema  ABDOMEN: soft, nontender, no hepatosplenomegaly, no masses and bowel sounds normal  MS: no gross musculoskeletal defects noted, no edema  SKIN: no suspicious lesions or rashes  NEURO: Normal strength and tone, mentation intact and speech normal  PSYCH: mentation appears normal, affect normal/bright      ASSESSMENT/PLAN:     Keiko was seen today for physical.    Diagnoses and all orders for this visit:    Routine general medical examination at a health care facility    Screening for lipid disorders  -     Lipid panel reflex to direct LDL Fasting    History of anemia  -     CBC with Platelets    Screen for vitamin deficiency  -     Vitamin D Deficiency    Screening for diabetes mellitus  -     COMPREHENSIVE METABOLIC PANEL  -     Hemoglobin A1c    Other orders  -     REVIEW OF HEALTH MAINTENANCE PROTOCOL ORDERS      COUNSELING:  Reviewed preventive health counseling, as reflected in patient instructions      BMI: " "  Estimated body mass index is 36.31 kg/m  as calculated from the following:    Height as of this encounter: 1.6 m (5' 3\").    Weight as of this encounter: 93 kg (205 lb).         She reports that she has never smoked. She has never used smokeless tobacco.      Kelly Snell, YUNIOR [facility] [place]  "

## 2023-02-19 DIAGNOSIS — E55.9 VITAMIN D DEFICIENCY: Primary | ICD-10-CM

## 2023-02-19 RX ORDER — ERGOCALCIFEROL 1.25 MG/1
50000 CAPSULE, LIQUID FILLED ORAL WEEKLY
Qty: 12 CAPSULE | Refills: 0 | Status: SHIPPED | OUTPATIENT
Start: 2023-02-19 | End: 2023-05-08

## 2023-02-19 NOTE — RESULT ENCOUNTER NOTE
Dear Keiko,     -LDL(bad) cholesterol level is just slightly elevated (improved from last year), HDL(good) cholesterol level is low which can increase your heart disease risk.  We can continue to check this annually.    -Liver and gallbladder tests (ALT,AST, Alk phos,bilirubin) are normal.  -Kidney function (GFR) is normal.  -Sodium is normal.  -Potassium is normal.  -Calcium is normal.  -Glucose is slightly elevated.    -Vitamin D level is low and oral supplementation should be started. I recommend Vitamin D supplement of 50,000 international unit(s) once weekly for 12 weeks and then transitioning to 2,000 international unit(s) once daily.  I sent in the prescription for the once weekly supplement.        Please send a [employer] message or call [phone]  if you have any questions.      YUNIOR Neville, CNP  [facility] [place]    If you have further questions about the interpretation of your labs, [email or web address] is a good website to check out for further information.

## 2023-06-25 ENCOUNTER — MYC MEDICAL ADVICE (OUTPATIENT)
Dept: FAMILY MEDICINE | Facility: CLINIC | Age: 43
End: 2023-06-25
Payer: COMMERCIAL

## 2023-06-25 DIAGNOSIS — E55.9 VITAMIN D DEFICIENCY: Primary | ICD-10-CM

## 2024-01-01 ENCOUNTER — ANCILLARY PROCEDURE (OUTPATIENT)
Dept: GENERAL RADIOLOGY | Facility: CLINIC | Age: 44
End: 2024-01-01
Attending: STUDENT IN AN ORGANIZED HEALTH CARE EDUCATION/TRAINING PROGRAM
Payer: COMMERCIAL

## 2024-01-01 ENCOUNTER — OFFICE VISIT (OUTPATIENT)
Dept: URGENT CARE | Facility: URGENT CARE | Age: 44
End: 2024-01-01
Payer: COMMERCIAL

## 2024-01-01 VITALS
TEMPERATURE: 97.2 F | SYSTOLIC BLOOD PRESSURE: 129 MMHG | DIASTOLIC BLOOD PRESSURE: 82 MMHG | HEART RATE: 77 BPM | OXYGEN SATURATION: 97 %

## 2024-01-01 DIAGNOSIS — R05.1 ACUTE COUGH: ICD-10-CM

## 2024-01-01 DIAGNOSIS — R50.9 FEVER, UNSPECIFIED FEVER CAUSE: ICD-10-CM

## 2024-01-01 DIAGNOSIS — R50.9 FEVER, UNSPECIFIED FEVER CAUSE: Primary | ICD-10-CM

## 2024-01-01 DIAGNOSIS — R05.2 SUBACUTE COUGH: ICD-10-CM

## 2024-01-01 DIAGNOSIS — H60.502 ACUTE OTITIS EXTERNA OF LEFT EAR, UNSPECIFIED TYPE: ICD-10-CM

## 2024-01-01 PROCEDURE — 71046 X-RAY EXAM CHEST 2 VIEWS: CPT | Mod: TC | Performed by: RADIOLOGY

## 2024-01-01 PROCEDURE — 99204 OFFICE O/P NEW MOD 45 MIN: CPT | Performed by: STUDENT IN AN ORGANIZED HEALTH CARE EDUCATION/TRAINING PROGRAM

## 2024-01-01 RX ORDER — BENZONATATE 200 MG/1
200 CAPSULE ORAL 3 TIMES DAILY PRN
Qty: 30 CAPSULE | Refills: 0 | Status: SHIPPED | OUTPATIENT
Start: 2024-01-01 | End: 2024-02-22

## 2024-01-01 RX ORDER — NEOMYCIN SULFATE, POLYMYXIN B SULFATE, HYDROCORTISONE 3.5; 10000; 1 MG/ML; [USP'U]/ML; MG/ML
3 SOLUTION/ DROPS AURICULAR (OTIC) 4 TIMES DAILY
Qty: 10 ML | Refills: 0 | Status: SHIPPED | OUTPATIENT
Start: 2024-01-01 | End: 2024-02-22

## 2024-01-01 RX ORDER — NEOMYCIN SULFATE, POLYMYXIN B SULFATE, HYDROCORTISONE 3.5; 10000; 1 MG/ML; [USP'U]/ML; MG/ML
3 SOLUTION/ DROPS AURICULAR (OTIC) 4 TIMES DAILY
Qty: 10 ML | Refills: 0 | Status: SHIPPED | OUTPATIENT
Start: 2024-01-01 | End: 2024-01-01

## 2024-01-01 NOTE — PROGRESS NOTES
chief complaint: Cough and fever    HPI:  Keiko Farah is a 43 year old female who presents today complaining of cough, muscle pain, and fever. Initially got sick n dec 19th w URI symptoms and stomach soreness but about 4 days ago she spiked fever again. Immediately started ibuprofen and acetaminophen when she noticed fever. Has some pain with coughing. No known exposures. . Feels like she cannot breath as deep as before.     Also having left ear pain gibson started about 5 days ago, almost same ay she had a fever     History obtained from the patient.    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  2013-10: Plantar fasciitis  2013-08: CARDIOVASCULAR SCREENING; LDL GOAL LESS THAN 160  2013-08: BMI 30.0-30.9,adult      Past Medical History:   Diagnosis Date    Dairy product intolerance     headaches    Gluten-sensitive enteropathy     swelling when consuming gluten    History of cold sores     HPV in female 01/01/2008       Social History     Tobacco Use    Smoking status: Never    Smokeless tobacco: Never   Substance Use Topics    Alcohol use: Not Currently     Comment: history of rashes       Review of systems  ROS negative except for pertinent positives listed in HPI      Vitals:    01/01/24 1053   BP: 129/82   Pulse: 77   Temp: 97.2  F (36.2  C)   TempSrc: Tympanic   SpO2: 97%       Physical Exam  Constitutional: healthy, alert, and no distress  Head: Normocephalic.   Neck: Neck supple. No adenopathy. T  ENT: Left ear canal erythematous with bulging TM.  Tenderness elicited upon palpation of the tragus.  ENT exam normal, no neck nodes or sinus tenderness  Cardiovascular:  RRR. No  murmurs, clicks gallops or rub  Respiratory:unlabored respiratory effort  Musculoskeletal: extremities normal-  no gross deformities noted, gait normal  Psychiatric: mentation appears normal and affect normal/bright      Assessment & Plan     Keiko was seen today for cough.    Diagnoses and all orders for this visit:    Acute cough  Fever, unspecified fever cause  Differential diagnosis of patients symptoms includes but not limited to postnasal drip, viral respiratory infection, streptococcal pharyngitis/tonsillitis, bronchiolitis, asthma exacerbation, e.t.c  Of note most likely cause is viral respiratory of infection. otitis externa could have also contributed to fever given repeat fever despite 3 days of call and cough, chest x-ray was ordered to rule out pneumonia.  Chest x-ray clear not concerning for any pulmonary pathology at this point.  Discussed safety precautions return to the ER.  -Conservative measures such as warm water or tea with honey  -Using a steamer, okay to use vicks menthol in the steamer  -As needed ibuprofen or Tylenol for fever  -Push fluids and rest  -     XR Chest 2 Views; Future        -     benzonatate (TESSALON) 200 MG capsule; Take 1 capsule (200 mg) by mouth 3 times daily as needed for cough    Acute otitis externa of left ear, unspecified type  -     neomycin-polymyxin-hydrocortisone (CORTISPORIN) 3.5-84296-6 otic solution; Place 3 drops in ear(s) 4 times daily      Clinical Decision Making:    At the end of the encounter, I discussed results, diagnosis, medications. Discussed red flags for immediate return to clinic/ER, as well as indications for follow up if no improvement. Patient understood and agreed to plan. Patient was stable for discharge.

## 2024-02-22 ENCOUNTER — OFFICE VISIT (OUTPATIENT)
Dept: FAMILY MEDICINE | Facility: CLINIC | Age: 44
End: 2024-02-22
Payer: COMMERCIAL

## 2024-02-22 VITALS
OXYGEN SATURATION: 98 % | TEMPERATURE: 97.1 F | WEIGHT: 211 LBS | HEIGHT: 63 IN | BODY MASS INDEX: 37.39 KG/M2 | DIASTOLIC BLOOD PRESSURE: 88 MMHG | RESPIRATION RATE: 18 BRPM | HEART RATE: 94 BPM | SYSTOLIC BLOOD PRESSURE: 118 MMHG

## 2024-02-22 DIAGNOSIS — Z13.1 SCREENING FOR DIABETES MELLITUS: ICD-10-CM

## 2024-02-22 DIAGNOSIS — Z00.00 ROUTINE GENERAL MEDICAL EXAMINATION AT A HEALTH CARE FACILITY: Primary | ICD-10-CM

## 2024-02-22 DIAGNOSIS — Z13.220 SCREENING FOR LIPID DISORDERS: ICD-10-CM

## 2024-02-22 DIAGNOSIS — Z12.4 CERVICAL CANCER SCREENING: ICD-10-CM

## 2024-02-22 DIAGNOSIS — E55.9 VITAMIN D DEFICIENCY: ICD-10-CM

## 2024-02-22 DIAGNOSIS — Z12.31 VISIT FOR SCREENING MAMMOGRAM: ICD-10-CM

## 2024-02-22 DIAGNOSIS — Z86.2 HISTORY OF ANEMIA: ICD-10-CM

## 2024-02-22 DIAGNOSIS — Z12.11 SCREEN FOR COLON CANCER: ICD-10-CM

## 2024-02-22 DIAGNOSIS — R39.15 URINARY URGENCY: ICD-10-CM

## 2024-02-22 LAB
ALBUMIN SERPL BCG-MCNC: 4.2 G/DL (ref 3.5–5.2)
ALP SERPL-CCNC: 52 U/L (ref 40–150)
ALT SERPL W P-5'-P-CCNC: 28 U/L (ref 0–50)
ANION GAP SERPL CALCULATED.3IONS-SCNC: 10 MMOL/L (ref 7–15)
AST SERPL W P-5'-P-CCNC: 18 U/L (ref 0–45)
BILIRUB SERPL-MCNC: 0.3 MG/DL
BUN SERPL-MCNC: 12.4 MG/DL (ref 6–20)
CALCIUM SERPL-MCNC: 9.1 MG/DL (ref 8.6–10)
CHLORIDE SERPL-SCNC: 105 MMOL/L (ref 98–107)
CHOLEST SERPL-MCNC: 175 MG/DL
CREAT SERPL-MCNC: 0.69 MG/DL (ref 0.51–0.95)
DEPRECATED HCO3 PLAS-SCNC: 25 MMOL/L (ref 22–29)
EGFRCR SERPLBLD CKD-EPI 2021: >90 ML/MIN/1.73M2
ERYTHROCYTE [DISTWIDTH] IN BLOOD BY AUTOMATED COUNT: 13.3 % (ref 10–15)
FASTING STATUS PATIENT QL REPORTED: YES
GLUCOSE SERPL-MCNC: 122 MG/DL (ref 70–99)
HBA1C MFR BLD: 6.1 % (ref 0–5.6)
HCT VFR BLD AUTO: 40.1 % (ref 35–47)
HDLC SERPL-MCNC: 39 MG/DL
HGB BLD-MCNC: 13.7 G/DL (ref 11.7–15.7)
LDLC SERPL CALC-MCNC: 114 MG/DL
MCH RBC QN AUTO: 28.8 PG (ref 26.5–33)
MCHC RBC AUTO-ENTMCNC: 34.2 G/DL (ref 31.5–36.5)
MCV RBC AUTO: 84 FL (ref 78–100)
NONHDLC SERPL-MCNC: 136 MG/DL
PLATELET # BLD AUTO: 300 10E3/UL (ref 150–450)
POTASSIUM SERPL-SCNC: 4.5 MMOL/L (ref 3.4–5.3)
PROT SERPL-MCNC: 7.2 G/DL (ref 6.4–8.3)
RBC # BLD AUTO: 4.76 10E6/UL (ref 3.8–5.2)
SODIUM SERPL-SCNC: 140 MMOL/L (ref 135–145)
TRIGL SERPL-MCNC: 111 MG/DL
VIT D+METAB SERPL-MCNC: 14 NG/ML (ref 20–50)
WBC # BLD AUTO: 8.2 10E3/UL (ref 4–11)

## 2024-02-22 PROCEDURE — 99396 PREV VISIT EST AGE 40-64: CPT | Performed by: NURSE PRACTITIONER

## 2024-02-22 PROCEDURE — 83036 HEMOGLOBIN GLYCOSYLATED A1C: CPT | Performed by: NURSE PRACTITIONER

## 2024-02-22 PROCEDURE — 80061 LIPID PANEL: CPT | Performed by: NURSE PRACTITIONER

## 2024-02-22 PROCEDURE — 85027 COMPLETE CBC AUTOMATED: CPT | Performed by: NURSE PRACTITIONER

## 2024-02-22 PROCEDURE — 80053 COMPREHEN METABOLIC PANEL: CPT | Performed by: NURSE PRACTITIONER

## 2024-02-22 PROCEDURE — 36415 COLL VENOUS BLD VENIPUNCTURE: CPT | Performed by: NURSE PRACTITIONER

## 2024-02-22 PROCEDURE — G0145 SCR C/V CYTO,THINLAYER,RESCR: HCPCS | Performed by: NURSE PRACTITIONER

## 2024-02-22 PROCEDURE — 87624 HPV HI-RISK TYP POOLED RSLT: CPT | Performed by: NURSE PRACTITIONER

## 2024-02-22 PROCEDURE — 82306 VITAMIN D 25 HYDROXY: CPT | Performed by: NURSE PRACTITIONER

## 2024-02-22 SDOH — HEALTH STABILITY: PHYSICAL HEALTH: ON AVERAGE, HOW MANY DAYS PER WEEK DO YOU ENGAGE IN MODERATE TO STRENUOUS EXERCISE (LIKE A BRISK WALK)?: 0 DAYS

## 2024-02-22 ASSESSMENT — SOCIAL DETERMINANTS OF HEALTH (SDOH): HOW OFTEN DO YOU GET TOGETHER WITH FRIENDS OR RELATIVES?: THREE TIMES A WEEK

## 2024-02-22 NOTE — RESULT ENCOUNTER NOTE
Frances Aden,     -Normal red blood cell (hgb) levels, normal white blood cell count and normal platelet levels.  -A1C (diabetic test) is elevated and is a sign of pre-diabetes. ADVISE: eating a low carbohydrate diet, exercising, trying to lose weight (if necessary) and rechecking your glucose level in 12 months.      Thank you,     Kelly Snell, YUNIOR, CNP  [facility] [place]

## 2024-02-22 NOTE — PROGRESS NOTES
"Preventive Care Visit  [facility] [place]  YUNIOR Neville CNP, Family Medicine  Feb 22, 2024    Assessment & Plan     Keiko was seen today for physical.    Diagnoses and all orders for this visit:    Routine general medical examination at a health care facility  -     REVIEW OF HEALTH MAINTENANCE PROTOCOL ORDERS    Screen for colon cancer  -     Colonoscopy Screening  Referral; Future    Screening for diabetes mellitus  -     COMPREHENSIVE METABOLIC PANEL  -     Hemoglobin A1c    Visit for screening mammogram  -     MA SCREENING DIGITAL BILAT - Future  (s+30); Future    Cervical cancer screening  -     Pap Screen with HPV - recommended age 30 - 65 years    Screening for lipid disorders    -     Lipid panel reflex to direct LDL Fasting    Vitamin D deficiency  -     Vitamin D Deficiency    History of anemia  -     CBC with Platelets    Urinary urgency  Plan further evaluation and treatment with pelvic floor PT.    -     Physical Therapy  Referral; Future    Other orders  -     PRIMARY CARE FOLLOW-UP SCHEDULING; Future                BMI  Estimated body mass index is 37.38 kg/m  as calculated from the following:    Height as of this encounter: 1.6 m (5' 3\").    Weight as of this encounter: 95.7 kg (211 lb).     Return in about 1 year (around 2/22/2025) for Preventative Visit .      Subjective   Keiko is a 44 year old, presenting for the following:  Physical        2/22/2024     7:31 AM   Additional Questions   Roomed by Evy PARIS        Via the Health Maintenance questionnaire, the patient has reported the following services have been completed -Mammogram, this information has been sent to the abstraction team.  Health Care Directive  Patient does not have a Health Care Directive or Living Will: Discussed advance care planning with patient; information given to patient to review.    HPI      Hemoglobin A1C   Date Value Ref Range Status   02/22/2024 6.1 (H) 0.0 - 5.6 % Final "     Comment:     Normal <5.7%   Prediabetes 5.7-6.4%    Diabetes 6.5% or higher     Note: Adopted from ADA consensus guidelines.   02/17/2023 5.7 (H) 0.0 - 5.6 % Final     Comment:     Normal <5.7%   Prediabetes 5.7-6.4%    Diabetes 6.5% or higher     Note: Adopted from ADA consensus guidelines.   03/09/2022 5.7 (H) 0.0 - 5.6 % Final     Comment:     Normal <5.7%   Prediabetes 5.7-6.4%    Diabetes 6.5% or higher     Note: Adopted from ADA consensus guidelines.   03/30/2021 5.7 (H) 0 - 5.6 % Final     Comment:     Normal <5.7% Prediabetes 5.7-6.4%  Diabetes 6.5% or higher - adopted from ADA   consensus guidelines.     02/14/2020 5.6 0 - 5.6 % Final     Comment:     Normal <5.7% Prediabetes 5.7-6.4%  Diabetes 6.5% or higher - adopted from ADA   consensus guidelines.     02/18/2019 5.8 (H) 0 - 5.6 % Final     Comment:     Normal <5.7% Prediabetes 5.7-6.4%  Diabetes 6.5% or higher - adopted from ADA   consensus guidelines.       +Urinary urgency and frequency.  +Stress incontinence.      Mirena IUD in place, very light menstrual cycles.      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 ages 25 and 23 (foster child), 15, 13 an 11           2/22/2024   General Health   How would you rate your overall physical health? Good   Feel stress (tense, anxious, or unable to sleep) Only a little   (!) STRESS CONCERN      2/22/2024   Nutrition   Three or more servings of calcium each day? Yes   Diet: Gluten-free/reduced   How many servings of fruit and vegetables per day? (!) 2-3   How many sweetened beverages each day? 0-1         2/22/2024   Exercise   Days per week of moderate/strenous exercise 0 days   (!) EXERCISE CONCERN      2/22/2024   Social Factors   Frequency of gathering with friends or relatives Three times a week   Worry food won't last until get money to buy more No   Food not last or not have enough money for food? No   Do you have housing?  Yes   Are you worried about  losing your housing? No   Lack of transportation? No   Unable to get utilities (heat,electricity)? No         2/22/2024   Dental   Dentist two times every year? (!) NO         2/22/2024   TB Screening   Were you born outside of [place]?  No         Today's PHQ-2 Score:       2/22/2024     7:24 AM   PHQ-2 ( 1999 [employer])   Q1: Little interest or pleasure in doing things 1   Q2: Feeling down, depressed or hopeless 0   PHQ-2 Score 1   Q1: Little interest or pleasure in doing things Several days   Q2: Feeling down, depressed or hopeless Not at all   PHQ-2 Score 1           2/22/2024   Substance Use   Alcohol more than 3/day or more than 7/wk Not Applicable   Do you use any other substances recreationally? No     Social History     Tobacco Use    Smoking status: Never    Smokeless tobacco: Never   Substance Use Topics    Alcohol use: Not Currently     Comment: history of rashes    Drug use: No             2/22/2024   Breast Cancer Screening   Family history of breast, colon, or ovarian cancer? Yes         2/22/2024   LAST FHS-7 RESULTS   1st degree relative breast or ovarian cancer Unknown   Any relative bilateral breast cancer No   Any male have breast cancer Unknown   Any woman have breast and ovarian cancer No   Any woman with breast cancer before 50yrs No   2 or more relatives with breast and/or ovarian cancer No   2 or more relatives with breast and/or bowel cancer Unknown       Mammogram Screening - Mammogram every 1-2 years updated in Health Maintenance based on mutual decision making        2/22/2024   STI Screening   New sexual partner(s) since last STI/HIV test? No     History of abnormal Pap smear: YES - updated in Problem List and Health Maintenance accordingly  If next pap smear is normal, ok to go to every 5 year pap/co-test.          Latest Ref Rng & Units 3/30/2021     8:27 AM 3/30/2021     7:52 AM 2/13/2018    10:17 AM   PAP / HPV   PAP (Historical)   NIL  NIL    HPV 16 DNA NEG^Negative Negative   Negative     HPV 18 DNA NEG^Negative Negative   Negative    Other HR HPV NEG^Negative Negative   Negative      The 10-year ASCVD risk score (Martinez ACHARYA, et al., 2019) is: 1.1%    Values used to calculate the score:      Age: 44 years      Sex: Female      Is Non- : No      Diabetic: No      Tobacco smoker: No      Systolic Blood Pressure: 118 mmHg      Is BP treated: No      HDL Cholesterol: 36 mg/dL      Total Cholesterol: 178 mg/dL    Reviewed and updated as needed this visit by Provider     Meds  Problems               BP Readings from Last 3 Encounters:   24 118/88   24 129/82   23 110/80    Wt Readings from Last 3 Encounters:   24 95.7 kg (211 lb)   23 93 kg (205 lb)   22 90.7 kg (200 lb)                  Patient Active Problem List   Diagnosis    Plantar fasciitis    TMJ (temporomandibular joint syndrome)    Recurrent cold sores    IUD (intrauterine device) in place - 2020 - Mirena    Elevated LFTs    Abnormal Pap smear of cervix     Past Surgical History:   Procedure Laterality Date     SECTION      3 Cesarian sections    COLPOSCOPY  2009    Colposcopy    TUBAL LIGATION  2011    Tubal Ligation    wisdom teeth[  1996       Social History     Tobacco Use    Smoking status: Never    Smokeless tobacco: Never   Substance Use Topics    Alcohol use: Not Currently     Comment: history of rashes     Family History   Problem Relation Age of Onset    Diabetes Mother 53        Type II Diabetes    Obesity Mother     Family History Negative Father     Gastrointestinal Disease Maternal Grandmother     Cervical Cancer Maternal Grandmother     Pancreatic Cancer Paternal Uncle 48        Pancrreatic cancer    Allergies Son         Milk and Soy    Cervical Cancer Maternal Aunt     Rheumatoid Arthritis Maternal Aunt 23    Glaucoma Half-Sister     Coronary Artery Disease No family hx of     Cerebrovascular Disease No family hx of     Breast Cancer No  "family hx of     Colon Cancer No family hx of     Thyroid Disease No family hx of          Current Outpatient Medications   Medication Sig Dispense Refill    fluticasone (FLONASE) 50 MCG/ACT nasal spray Spray 1 spray into both nostrils daily 16 g 1    levonorgestrel (MIRENA) 20 MCG/24HR IUD 1 each (20 mcg) by Intrauterine route once           Review of Systems  Constitutional, HEENT, cardiovascular, pulmonary, gi and gu systems are negative, except as otherwise noted.     Objective    Exam  /88   Pulse 94   Temp 97.1  F (36.2  C)   Resp 18   Ht 1.6 m (5' 3\")   Wt 95.7 kg (211 lb)   SpO2 98%   BMI 37.38 kg/m     Estimated body mass index is 37.38 kg/m  as calculated from the following:    Height as of this encounter: 1.6 m (5' 3\").    Weight as of this encounter: 95.7 kg (211 lb).    Physical Exam    GENERAL: alert and no distress  EYES: Eyes grossly normal to inspection  HENT: ear canals and TM's normal, nose and mouth without ulcers or lesions  NECK: no adenopathy, no asymmetry, masses, or scars  RESP: lungs clear to auscultation - no rales, rhonchi or wheezes  CV: regular rate and rhythm, normal S1 S2, no S3 or S4, no murmur, click or rub, no peripheral edema  ABDOMEN: soft, nontender, no hepatosplenomegaly, no masses and bowel sounds normal   (female): normal female external genitalia, normal urethral meatus, normal vaginal mucosa, and normal cervix/adnexa/uterus without masses or discharge  MS: no gross musculoskeletal defects noted, no edema  SKIN: no suspicious lesions or rashes  NEURO: Normal strength and tone, mentation intact and speech normal  PSYCH: mentation appears normal, affect normal/bright      Signed Electronically by: Kelly Snell, YUNIOR CNP    "

## 2024-02-22 NOTE — PATIENT INSTRUCTIONS
Preventive Care Advice   This is general advice given by our system to help you stay healthy. However, your care team may have specific advice just for you. Please talk to your care team about your preventive care needs.  Nutrition  Eat 5 or more servings of fruits and vegetables each day.  Try wheat bread, brown rice and whole grain pasta (instead of white bread, rice, and pasta).  Get enough calcium and vitamin D. Check the label on foods and aim for 100% of the RDA (recommended daily allowance).  Lifestyle  Exercise at least 150 minutes each week  (30 minutes a day, 5 days a week).  Do muscle strengthening activities 2 days a week. These help control your weight and prevent disease.  No smoking.  Wear sunscreen to prevent skin cancer.  Have a dental exam and cleaning every 6 months.  Yearly exams  See your health care team every year to talk about:  Any changes in your health.  Any medicines your care team has prescribed.  Preventive care, family planning, and ways to prevent chronic diseases.  Shots (vaccines)   HPV shots (up to age 26), if you've never had them before.  Hepatitis B shots (up to age 59), if you've never had them before.  COVID-19 shot: Get this shot when it's due.  Flu shot: Get a flu shot every year.  Tetanus shot: Get a tetanus shot every 10 years.  Pneumococcal, hepatitis A, and RSV shots: Ask your care team if you need these based on your risk.  Shingles shot (for age 50 and up)  General health tests  Diabetes screening:  Starting at age 35, Get screened for diabetes at least every 3 years.  If you are younger than age 35, ask your care team if you should be screened for diabetes.  Cholesterol test: At age 39, start having a cholesterol test every 5 years, or more often if advised.  Bone density scan (DEXA): At age 50, ask your care team if you should have this scan for osteoporosis (brittle bones).  Hepatitis C: Get tested at least once in your life.  STIs (sexually transmitted  infections)  Before age 24: Ask your care team if you should be screened for STIs.  After age 24: Get screened for STIs if you're at risk. You are at risk for STIs (including HIV) if:  You are sexually active with more than one person.  You don't use condoms every time.  You or a partner was diagnosed with a sexually transmitted infection.  If you are at risk for HIV, ask about PrEP medicine to prevent HIV.  Get tested for HIV at least once in your life, whether you are at risk for HIV or not.  Cancer screening tests  Cervical cancer screening: If you have a cervix, begin getting regular cervical cancer screening tests starting at age 21.  Breast cancer scan (mammogram): If you've ever had breasts, begin having regular mammograms starting at age 40. This is a scan to check for breast cancer.  Colon cancer screening: It is important to start screening for colon cancer at age 45.  Have a colonoscopy test every 10 years (or more often if you're at risk) Or, ask your provider about stool tests like a FIT test every year or Cologuard test every 3 years.  To learn more about your testing options, visit:   https://[email or web address].pdf.  For help making a decision, visit:   https://bit.ly/id83625.  Prostate cancer screening test: If you have a prostate, ask your care team if a prostate cancer screening test (PSA) at age 55 is right for you.  Lung cancer screening: If you are a current or former smoker ages 50 to 80, ask your care team if ongoing lung cancer screenings are right for you.  For informational purposes only. Not to replace the advice of your health care provider. Copyright   2023 [employer]. All rights reserved. Clinically reviewed by the [facility]. [employer] 239804 - REV 01/24.

## 2024-02-22 NOTE — COMMUNITY RESOURCES LIST (ENGLISH)
02/22/2024   [facility] [employer]  N/A  For questions about this resource list or additional care needs, please contact your primary care clinic or care manager.  Phone: [phone]   Email: N/A   Address: [address] [place] [id number]   Hours: N/A        Exercise and Recreation       Gym or workout facility  1  [address] [place] [address] - Kickboxing Classes Distance: 6.7 miles      In-Person   [address] [place] [address]  Language: English  Hours: Mon - Fri 6:00 AM - 12:30 PM , Mon - Fri 3:00 PM - 8:00 PM , Sat 8:00 AM - 12:00 PM  Fees: Self Pay   Phone: [phone] Website: [email or web address]     2  [employer] [facility] Distance: 6.76 miles      In-Person   [address] [place] [address]  Language: English  Hours: Mon - Sun Open 24 Hours  Fees: Insurance, Self Pay, Sliding Fee   Phone: [phone] Website: [email or web address]          Important Numbers & Websites       Emergency Services   [address]  [place] [address]  Poison Control   (343) 740-2453  Suicide Prevention Lifeline   (734) 230-8796 (TALK)  Child Abuse Hotline   (748) 134-9554 (4-A-Child)  Sexual Assault Hotline   (213) 895-6881 (HOPE)  National Runaway Safeline   (341) 931-4555 (RUNAWAY)  All-Options Talkline   (756) 633-8199  Substance Abuse Referral   (281) 387-3302 (HELP)

## 2024-02-23 DIAGNOSIS — E55.9 VITAMIN D DEFICIENCY: Primary | ICD-10-CM

## 2024-02-23 RX ORDER — ERGOCALCIFEROL 1.25 MG/1
50000 CAPSULE, LIQUID FILLED ORAL WEEKLY
Qty: 12 CAPSULE | Refills: 0 | Status: SHIPPED | OUTPATIENT
Start: 2024-02-23 | End: 2024-05-11

## 2024-02-23 NOTE — RESULT ENCOUNTER NOTE
Dear Keiko,     -LDL(bad) cholesterol level is elevated, HDL(good) cholesterol level is low which can increase your heart disease risk.  A diet high in fat and simple carbohydrates, genetics and being overweight can contribute to this. ADVISE: exercising 150 minutes of aerobic exercise per week (30 minutes for 5 days per week or 50 minutes for 3 days per week are options) and eating a low saturated fat/low carbohydrate diet are helpful to improve this. In 12 months, you should recheck your fasting cholesterol panel.     -Liver and gallbladder tests (ALT,AST, Alk phos,bilirubin) are normal.  -Kidney function (GFR) is normal.  -Sodium is normal.  -Potassium is normal.  -Calcium is normal.  -Glucose is slightly elevated and is a sign of early diabetes (prediabetes). ADVISE:  eating a low carbohydrate diet, exercising, trying to lose weight (if necessary) and rechecking your glucose level in 12 months.    -Vitamin D level is low and oral supplementation should be started.  I sent in a once weekly vitamin d supplement to your pharmacy and recommend taking this for 12 weeks and then starting a daily, maintenance vitamin d supplement of 2000 international unit(s) daily.        Please send a [employer] message or call [phone]  if you have any questions.      Kelly Snell, YUNIOR, CNP  [facility] [place]    If you have further questions about the interpretation of your labs, labtestsonline.org is a good website to check out for further information.

## 2024-02-27 LAB
BKR LAB AP GYN ADEQUACY: NORMAL
BKR LAB AP GYN INTERPRETATION: NORMAL
BKR LAB AP HPV REFLEX: NORMAL
BKR LAB AP PREVIOUS ABNORMAL: NORMAL
PATH REPORT.COMMENTS IMP SPEC: NORMAL
PATH REPORT.COMMENTS IMP SPEC: NORMAL
PATH REPORT.RELEVANT HX SPEC: NORMAL

## 2024-02-29 LAB
HUMAN PAPILLOMA VIRUS 16 DNA: NEGATIVE
HUMAN PAPILLOMA VIRUS 18 DNA: NEGATIVE
HUMAN PAPILLOMA VIRUS FINAL DIAGNOSIS: NORMAL
HUMAN PAPILLOMA VIRUS OTHER HR: NEGATIVE

## 2024-03-01 PROBLEM — R87.619 ABNORMAL PAP SMEAR OF CERVIX: Status: ACTIVE | Noted: 2021-04-06

## 2024-03-12 ENCOUNTER — HOSPITAL ENCOUNTER (OUTPATIENT)
Facility: CLINIC | Age: 44
End: 2024-03-12
Attending: INTERNAL MEDICINE | Admitting: INTERNAL MEDICINE
Payer: COMMERCIAL

## 2024-03-12 ENCOUNTER — TELEPHONE (OUTPATIENT)
Dept: GASTROENTEROLOGY | Facility: CLINIC | Age: 44
End: 2024-03-12
Payer: COMMERCIAL

## 2024-03-12 NOTE — TELEPHONE ENCOUNTER
"Endoscopy Scheduling Screen    Have you had a positive Covid test in the last 14 days?  No    What is your communication preference for Instructions and/or Bowel Prep?   MyChart    What insurance is in the chart?  Other:  bcbs    Ordering/Referring Provider:     SMILEY FITCH      (If ordering provider performs procedure, schedule with ordering provider unless otherwise instructed. )    BMI: Estimated body mass index is 37.38 kg/m  as calculated from the following:    Height as of 2/22/24: 1.6 m (5' 3\").    Weight as of 2/22/24: 95.7 kg (211 lb).     Sedation Ordered  moderate sedation.   If patient BMI > 50 do not schedule in [facility].    If patient BMI > 45 do not schedule at [facility].    Are you taking methadone or Suboxone?  No    Have you had difficulties, pain, or discomfort during past endoscopy procedures?  No    Are you taking any prescription medications for pain 3 or more times per week?   NO, No RN review required.    Do you have a history of malignant hyperthermia?  No    (Females) Are you currently pregnant?   No     Have you been diagnosed or told you have pulmonary hypertension?   No    Do you have an LVAD?  No    Have you been told you have moderate to severe sleep apnea?  No    Have you been told you have COPD, asthma, or any other lung disease?  No    Do you have any heart conditions?  No     Have you ever had or are you waiting for an organ transplant?  No. Continue scheduling, no site restrictions.    Have you had a stroke or transient ischemic attack (TIA aka \"mini stroke\" in the last 6 months?   No    Have you been diagnosed with or been told you have cirrhosis of the liver?   No    Are you currently on dialysis?   No    Do you need assistance transferring?   No    BMI: Estimated body mass index is 37.38 kg/m  as calculated from the following:    Height as of 2/22/24: 1.6 m (5' 3\").    Weight as of 2/22/24: 95.7 kg (211 lb).     Is patients BMI > 40 and scheduling location [facility]?  No    Do you take an " injectable medication for weight loss or diabetes (excluding insulin)?  No    Do you take the medication Naltrexone?  No    Do you take blood thinners?  No       Prep   Are you currently on dialysis or do you have chronic kidney disease?  No    Do you have a diagnosis of diabetes?  No    Do you have a diagnosis of cystic fibrosis (CF)?  No    On a regular basis do you go 3 -5 days between bowel movements?  No    BMI > 40?  No    [facility] Pharm[facility]    [facility] [address], [place] [address]  [address] [place] [address]  Phone: [phone] Fax: [phone]      Final Scheduling Details     Procedure scheduled  Colonoscopy    Surgeon:  Hector     Date of procedure:  6/12     Pre-OP / PAC:   No - Not required for this site.    Location  [facility] - Per order.    Sedation   Moderate Sedation - Per order.      Patient Reminders:   You will receive a call from a Nurse to review instructions and health history.  This assessment must be completed prior to your procedure.  Failure to complete the Nurse assessment may result in the procedure being cancelled.      On the day of your procedure, please designate an adult(s) who can drive you home stay with you for the next 24 hours. The medicines used in the exam will make you sleepy. You will not be able to drive.      You cannot take public transportation, ride share services, or non-medical taxi service without a responsible caregiver.  Medical transport services are allowed with the requirement that a responsible caregiver will receive you at your destination.  We require that drivers and caregivers are confirmed prior to your procedure.

## 2024-06-01 ENCOUNTER — TELEPHONE (OUTPATIENT)
Dept: GASTROENTEROLOGY | Facility: CLINIC | Age: 44
End: 2024-06-01

## 2024-06-01 NOTE — TELEPHONE ENCOUNTER
Pre visit planning completed.      Procedure details:    Patient scheduled for Colonoscopy  on 06.12.2024.     Arrival time: 1030. Procedure time 1115    Facility location: [facility]; [address] [place] [id number]. Check in location: Main entrance at . Come through the roundabout underneath the awning (not the ER entrance).    Sedation type: Conscious sedation     Pre op exam needed? N/A    Indication for procedure:  Screen for colon cancer       Chart review:     Electronic implanted devices? No    Recent diagnosis of diverticulitis within the last 6 weeks? No    Diabetic? No      Medication review:    Anticoagulants? No    NSAIDS? No NSAID medications per patient's medication list.  RN will verify with pre-assessment call.    Other medication HOLDING recommendations:  N/A      Prep for procedure:     Bowel prep recommendation: Standard Miralax  Due to: standard bowel prep.    Prep instructions sent via [employer] Nash RN  Endoscopy Procedure Pre Assessment RN  [phone] option 4

## 2024-06-04 NOTE — TELEPHONE ENCOUNTER
Pre assessment completed for upcoming procedure.   (Please see previous telephone encounter notes for complete details)      Procedure details:    Arrival time and facility location reviewed.    Pre op exam needed? N/A    Designated  policy reviewed. Instructed to have someone stay 6  hours post procedure.       Medication review:    Medications reviewed. Please see supporting documentation below. Holding recommendations discussed (if applicable).   N/A      Prep for procedure:     Procedure prep instructions reviewed.        Any additional information needed:  N/A      Patient  verbalized understanding and had no questions or concerns at this time.      Angela Nash RN  Endoscopy Procedure Pre Assessment   [phone] option 4

## 2024-06-10 ENCOUNTER — TELEPHONE (OUTPATIENT)
Dept: GASTROENTEROLOGY | Facility: CLINIC | Age: 44
End: 2024-06-10
Payer: COMMERCIAL

## 2024-06-10 NOTE — TELEPHONE ENCOUNTER
Caller:     Reason for Reschedule/Cancellation   (please be detailed, any staff messages or encounters to note?):       Prior to reschedule please review:  Ordering Provider: SMILEY FITCH   Sedation Determined: CS  Does patient have any ASC Exclusions, please identify?:       Notes on Cancelled Procedure:  Procedure: Lower Endoscopy [Colonoscopy]   Date: 6/12  Location: [facility]; [address] [place] [address]  Surgeon: DAISY      Rescheduled: No,         Did you cancel or rescheduled an EUS procedure? No.

## 2024-07-07 ENCOUNTER — HEALTH MAINTENANCE LETTER (OUTPATIENT)
Age: 44
End: 2024-07-07

## 2024-08-28 ENCOUNTER — PATIENT OUTREACH (OUTPATIENT)
Dept: CARE COORDINATION | Facility: CLINIC | Age: 44
End: 2024-08-28
Payer: COMMERCIAL

## 2024-11-07 ENCOUNTER — HOSPITAL ENCOUNTER (OUTPATIENT)
Dept: MAMMOGRAPHY | Facility: CLINIC | Age: 44
Discharge: HOME | End: 2024-11-07
Attending: PHYSICIAN ASSISTANT | Admitting: PHYSICIAN ASSISTANT
Payer: COMMERCIAL

## 2024-11-07 DIAGNOSIS — Z12.31 VISIT FOR SCREENING MAMMOGRAM: ICD-10-CM

## 2024-11-07 PROCEDURE — 77063 BREAST TOMOSYNTHESIS BI: CPT

## 2024-11-20 ENCOUNTER — OFFICE VISIT (OUTPATIENT)
Dept: FAMILY MEDICINE | Facility: CLINIC | Age: 44
End: 2024-11-20
Payer: COMMERCIAL

## 2024-11-20 VITALS
RESPIRATION RATE: 18 BRPM | BODY MASS INDEX: 37.93 KG/M2 | DIASTOLIC BLOOD PRESSURE: 87 MMHG | WEIGHT: 214.1 LBS | OXYGEN SATURATION: 98 % | TEMPERATURE: 97.7 F | HEIGHT: 63 IN | HEART RATE: 81 BPM | SYSTOLIC BLOOD PRESSURE: 134 MMHG

## 2024-11-20 DIAGNOSIS — M54.50 ACUTE BILATERAL LOW BACK PAIN WITHOUT SCIATICA: Primary | ICD-10-CM

## 2024-11-20 LAB
ALBUMIN UR-MCNC: ABNORMAL MG/DL
APPEARANCE UR: CLEAR
BILIRUB UR QL STRIP: ABNORMAL
CLUE CELLS: ABNORMAL
COLOR UR AUTO: YELLOW
GLUCOSE UR STRIP-MCNC: NEGATIVE MG/DL
HGB UR QL STRIP: ABNORMAL
KETONES UR STRIP-MCNC: NEGATIVE MG/DL
LEUKOCYTE ESTERASE UR QL STRIP: NEGATIVE
NITRATE UR QL: NEGATIVE
PH UR STRIP: 6.5 [PH] (ref 5–7)
RBC #/AREA URNS AUTO: ABNORMAL /HPF
SP GR UR STRIP: 1.02 (ref 1–1.03)
SQUAMOUS #/AREA URNS AUTO: ABNORMAL /LPF
TRICHOMONAS, WET PREP: ABNORMAL
UROBILINOGEN UR STRIP-ACNC: 1 E.U./DL
WBC #/AREA URNS AUTO: ABNORMAL /HPF
WBC'S/HIGH POWER FIELD, WET PREP: ABNORMAL
YEAST, WET PREP: ABNORMAL

## 2024-11-20 PROCEDURE — 81001 URINALYSIS AUTO W/SCOPE: CPT | Performed by: PHYSICIAN ASSISTANT

## 2024-11-20 PROCEDURE — 87210 SMEAR WET MOUNT SALINE/INK: CPT | Performed by: PHYSICIAN ASSISTANT

## 2024-11-20 ASSESSMENT — PAIN SCALES - GENERAL: PAINLEVEL_OUTOF10: MODERATE PAIN (5)

## 2024-11-20 NOTE — PROGRESS NOTES
"  Assessment & Plan     Acute bilateral low back pain without sciatica  Suspect musculoskeletal etiology. Referred to PT. OK to use ibuprofen as needed. Heat several times per day. Stretching and ROM exercises. Follow up if not improving after PT, sooner if worsening.  - UA with Microscopic reflex to Culture - lab collect  - Wet prep - lab collect  - Physical Therapy  Referral    PATY [facility] on 11/20/2024 at 4:46 PM        Bryan Aden is a 44 year old, presenting for the following health issues:  Musculoskeletal Problem (Back pain- Since 10/2024, constant, lower back, and OTC Ibuprofen /Pain: 5 )        11/20/2024     4:21 PM   Additional Questions   Roomed by Mary Grace JANSEN     History of Present Illness       Back Pain:  She presents for follow up of back pain. Patient's back pain is a recurring problem.  Location of back pain:  Other  Description of back pain: dull ache, sharp and other  Back pain spreads: nowhere    Since patient first noticed back pain, pain is: always present, but gets better and worse  Does back pain interfere with her job:  Yes         6 weeks of low back pain  Worse with standing on feet for long periods of time  Better with laying down and resting  Ibuprofen is helpful   Usually when this happens has bacterial vaginal infection   Recalls this happening sometime in the last year     Started cycle class about 1.5 months prior to back pain onset  Sit to stand desk started in August  No injury     No dysuria or hematuria   Has chronic issues with emptying bladder since her multiple C sections but no change in frequency/urgency         Objective    /87   Pulse 81   Temp 97.7  F (36.5  C) (Tympanic)   Resp 18   Ht 1.594 m (5' 2.76\")   Wt 97.1 kg (214 lb 1.6 oz)   LMP 11/09/2024 (Approximate)   SpO2 98%   BMI 38.22 kg/m    Body mass index is 38.22 kg/m .  Physical Exam   GENERAL: alert and no distress  ABDOMEN: soft, nontender, no hepatosplenomegaly, no " masses and bowel sounds normal  SKIN: no suspicious lesions or rashes  NEURO: Normal strength and tone, mentation intact and speech normal  BACK: no CVA tenderness, some tightness and tenderness bilateral lumbar paraspinal muscles   PSYCH: mentation appears normal, affect normal/bright        Signed Electronically by: Shanda Chapin PA-C on 11/20/2024 at 4:47 PM

## 2024-11-21 NOTE — RESULT ENCOUNTER NOTE
Keiko,    Urine appears contaminated (from the surrounding skin). No evidence of infection. No change in the plan that we discussed yesterday. Follow up if things aren't improving after PT. Please let me know if you have any further questions.    Take care,  Shanda Chapin PA-C on 11/21/2024 at 8:25 AM

## 2025-01-08 ENCOUNTER — PATIENT OUTREACH (OUTPATIENT)
Dept: CARE COORDINATION | Facility: CLINIC | Age: 45
End: 2025-01-08
Payer: COMMERCIAL

## 2025-01-22 ENCOUNTER — MYC MEDICAL ADVICE (OUTPATIENT)
Dept: FAMILY MEDICINE | Facility: CLINIC | Age: 45
End: 2025-01-22
Payer: COMMERCIAL

## 2025-02-17 ENCOUNTER — TELEPHONE (OUTPATIENT)
Dept: GASTROENTEROLOGY | Facility: CLINIC | Age: 45
End: 2025-02-17
Payer: COMMERCIAL

## 2025-02-17 NOTE — TELEPHONE ENCOUNTER
Rescheduled Colonoscopy  Due to no ride.    Pre visit planning completed.      Procedure details:    Patient scheduled for Colonoscopy on 02.25.2025.     Arrival time: 1020. Procedure time 1105    Facility location: [facility]; [address] [place] [address]. Check in location: Main entrance, door #1 on the North side of the building under roundabout awning. DO NOT GO TO SURGERY/ED ENTRANCE.     Sedation type: Conscious sedation     Pre op exam needed? No.    Indication for procedure:  Screen for colon cancer       Chart review:     Electronic implanted devices? No    Recent diagnosis of diverticulitis within the last 6 weeks? No      Medication review:    Diabetic? No    Anticoagulants? No    Weight loss medication/injectable? No GLP-1 medication per patient's medication list. Nursing to verify with pre-assessment call.    Other medication HOLDING recommendations:  N/A      Prep for procedure:     Bowel prep recommendation: Standard Miralax.   Due to: standard bowel prep    Prep instructions sent via [employer]         Angela Nash RN  Endoscopy Procedure Pre Assessment   [phone] option 3

## 2025-02-17 NOTE — TELEPHONE ENCOUNTER
Attempted to contact patient in order to complete pre assessment questions.     No answer. Left message to return call to [phone] option 3.    Callback communication sent via [employer].    Vicenta Varner LPN

## 2025-02-18 ENCOUNTER — MYC MEDICAL ADVICE (OUTPATIENT)
Dept: FAMILY MEDICINE | Facility: CLINIC | Age: 45
End: 2025-02-18
Payer: COMMERCIAL

## 2025-02-18 DIAGNOSIS — B00.1 RECURRENT COLD SORES: Primary | ICD-10-CM

## 2025-02-18 NOTE — TELEPHONE ENCOUNTER
Pre assessment completed for upcoming procedure.   (Please see previous telephone encounter notes for complete details)    Patient returned call.       Procedure details:    Arrival time and facility location reviewed.    Pre op exam needed? No.    Designated  policy reviewed. Instructed to have someone stay 6  hours post procedure.       Medication review:    Medications reviewed. Please see supporting documentation below. Holding recommendations discussed (if applicable).       Prep for procedure:     Procedure prep instructions reviewed.        Any additional information needed:  N/A      Patient verbalized understanding and had no questions or concerns at this time.      Laura Moore RN  Endoscopy Procedure Pre Assessment   [phone] option 3

## 2025-02-20 RX ORDER — VALACYCLOVIR HYDROCHLORIDE 1 G/1
2000 TABLET, FILM COATED ORAL 2 TIMES DAILY
Qty: 16 TABLET | Refills: 1 | Status: SHIPPED | OUTPATIENT
Start: 2025-02-20 | End: 2025-02-21

## 2025-02-25 ENCOUNTER — HOSPITAL ENCOUNTER (OUTPATIENT)
Facility: CLINIC | Age: 45
Discharge: HOME | End: 2025-02-25
Attending: COLON & RECTAL SURGERY | Admitting: COLON & RECTAL SURGERY
Payer: COMMERCIAL

## 2025-02-25 VITALS
SYSTOLIC BLOOD PRESSURE: 110 MMHG | OXYGEN SATURATION: 97 % | RESPIRATION RATE: 18 BRPM | DIASTOLIC BLOOD PRESSURE: 64 MMHG | BODY MASS INDEX: 37.74 KG/M2 | HEIGHT: 63 IN | TEMPERATURE: 97.9 F | WEIGHT: 213 LBS | HEART RATE: 64 BPM

## 2025-02-25 LAB — COLONOSCOPY: NORMAL

## 2025-02-25 PROCEDURE — 45380 COLONOSCOPY AND BIOPSY: CPT | Mod: PT | Performed by: COLON & RECTAL SURGERY

## 2025-02-25 PROCEDURE — 88305 TISSUE EXAM BY PATHOLOGIST: CPT | Mod: TC | Performed by: COLON & RECTAL SURGERY

## 2025-02-25 PROCEDURE — 250N000013 HC RX MED GY IP 250 OP 250 PS 637: Performed by: COLON & RECTAL SURGERY

## 2025-02-25 PROCEDURE — 88305 TISSUE EXAM BY PATHOLOGIST: CPT | Mod: 26 | Performed by: PATHOLOGY

## 2025-02-25 PROCEDURE — 999N000127 HC STATISTIC PERIPHERAL IV START W US GUIDANCE

## 2025-02-25 PROCEDURE — 250N000011 HC RX IP 250 OP 636: Performed by: COLON & RECTAL SURGERY

## 2025-02-25 PROCEDURE — G0500 MOD SEDAT ENDO SERVICE >5YRS: HCPCS | Performed by: COLON & RECTAL SURGERY

## 2025-02-25 RX ORDER — ONDANSETRON 2 MG/ML
4 INJECTION INTRAMUSCULAR; INTRAVENOUS
Status: DISCONTINUED | OUTPATIENT
Start: 2025-02-25 | End: 2025-02-25 | Stop reason: HOSPADM

## 2025-02-25 RX ORDER — FLUMAZENIL 0.1 MG/ML
0.2 INJECTION, SOLUTION INTRAVENOUS
Status: DISCONTINUED | OUTPATIENT
Start: 2025-02-25 | End: 2025-02-25 | Stop reason: HOSPADM

## 2025-02-25 RX ORDER — FENTANYL CITRATE 50 UG/ML
25-100 INJECTION, SOLUTION INTRAMUSCULAR; INTRAVENOUS EVERY 5 MIN PRN
Status: DISCONTINUED | OUTPATIENT
Start: 2025-02-25 | End: 2025-02-25 | Stop reason: HOSPADM

## 2025-02-25 RX ORDER — LIDOCAINE 40 MG/G
CREAM TOPICAL
Status: DISCONTINUED | OUTPATIENT
Start: 2025-02-25 | End: 2025-02-25 | Stop reason: HOSPADM

## 2025-02-25 RX ORDER — ATROPINE SULFATE 0.1 MG/ML
1 INJECTION INTRAVENOUS
Status: DISCONTINUED | OUTPATIENT
Start: 2025-02-25 | End: 2025-02-25 | Stop reason: HOSPADM

## 2025-02-25 RX ORDER — EPINEPHRINE 1 MG/ML
0.1 INJECTION, SOLUTION, CONCENTRATE INTRAVENOUS
Status: DISCONTINUED | OUTPATIENT
Start: 2025-02-25 | End: 2025-02-25 | Stop reason: HOSPADM

## 2025-02-25 RX ORDER — SIMETHICONE 40MG/0.6ML
133 SUSPENSION, DROPS(FINAL DOSAGE FORM)(ML) ORAL
Status: COMPLETED | OUTPATIENT
Start: 2025-02-25 | End: 2025-02-25

## 2025-02-25 RX ORDER — NALOXONE HYDROCHLORIDE 0.4 MG/ML
0.4 INJECTION, SOLUTION INTRAMUSCULAR; INTRAVENOUS; SUBCUTANEOUS
Status: DISCONTINUED | OUTPATIENT
Start: 2025-02-25 | End: 2025-02-25 | Stop reason: HOSPADM

## 2025-02-25 RX ORDER — PROCHLORPERAZINE MALEATE 10 MG
10 TABLET ORAL EVERY 6 HOURS PRN
Status: DISCONTINUED | OUTPATIENT
Start: 2025-02-25 | End: 2025-02-25 | Stop reason: HOSPADM

## 2025-02-25 RX ORDER — DIPHENHYDRAMINE HYDROCHLORIDE 50 MG/ML
25-50 INJECTION INTRAMUSCULAR; INTRAVENOUS
Status: DISCONTINUED | OUTPATIENT
Start: 2025-02-25 | End: 2025-02-25 | Stop reason: HOSPADM

## 2025-02-25 RX ORDER — ONDANSETRON 4 MG/1
4 TABLET, ORALLY DISINTEGRATING ORAL EVERY 6 HOURS PRN
Status: DISCONTINUED | OUTPATIENT
Start: 2025-02-25 | End: 2025-02-25 | Stop reason: HOSPADM

## 2025-02-25 RX ORDER — NALOXONE HYDROCHLORIDE 0.4 MG/ML
0.2 INJECTION, SOLUTION INTRAMUSCULAR; INTRAVENOUS; SUBCUTANEOUS
Status: DISCONTINUED | OUTPATIENT
Start: 2025-02-25 | End: 2025-02-25 | Stop reason: HOSPADM

## 2025-02-25 RX ORDER — ONDANSETRON 2 MG/ML
4 INJECTION INTRAMUSCULAR; INTRAVENOUS EVERY 6 HOURS PRN
Status: DISCONTINUED | OUTPATIENT
Start: 2025-02-25 | End: 2025-02-25 | Stop reason: HOSPADM

## 2025-02-25 RX ORDER — OMEGA-3 FATTY ACIDS/FISH OIL 300-1000MG
200 CAPSULE ORAL EVERY 4 HOURS PRN
COMMUNITY

## 2025-02-25 RX ADMIN — MIDAZOLAM 2 MG: 1 INJECTION INTRAMUSCULAR; INTRAVENOUS at 08:37

## 2025-02-25 RX ADMIN — FENTANYL CITRATE 100 MCG: 50 INJECTION, SOLUTION INTRAMUSCULAR; INTRAVENOUS at 08:37

## 2025-02-25 RX ADMIN — SIMETHICONE 133 MG: 20 SUSPENSION/ DROPS ORAL at 08:45

## 2025-02-25 ASSESSMENT — ACTIVITIES OF DAILY LIVING (ADL)
ADLS_ACUITY_SCORE: 41

## 2025-02-25 NOTE — H&P
Pre-Endoscopy History and Physical     Keiko Farah MRN# [id number]   YOB: 1980 Age: 45 year old     Date of Procedure: 2/25/2025  Primary care provider: Kelly Snell  Type of Endoscopy: Colonoscopy  Reason for Procedure: Screening  Type of Anesthesia Anticipated: Moderate Sedation    HPI:    Keiko is a 45 year old female who will be undergoing the above procedure.      A history and physical has been performed. The patient's medications and allergies have been reviewed. The risks and benefits of the procedure and the sedation options and risks were discussed with the patient.  All questions were answered and informed consent was obtained.      She denies a personal or family history of anesthesia complications or bleeding disorders.     Allergies   Allergen Reactions    Gluten Meal      headache    Milk Protein      Stomach ache, protein        Current Facility-Administered Medications   Medication Dose Route Frequency Provider Last Rate Last Admin    atropine injection 1 mg  1 mg Intravenous Once PRN Yulissa Pak MD        benzocaine 20% (HURRICAINE/TOPEX) 20 % spray 0.5 mL  1 spray Mouth/Throat Once PRN Yulissa Pak MD        diphenhydrAMINE (BENADRYL) injection 25-50 mg  25-50 mg Intravenous Once PRN Yulissa Pak MD        EPINEPHrine PF (ADRENALIN) injection 0.1 mg  0.1 mg Submucosal Once PRN Yulissa Pak MD        fentaNYL (PF) (SUBLIMAZE) injection  mcg   mcg Intravenous Q5 Min PRN Yulissa Pak MD        flumazenil (ROMAZICON) injection 0.2 mg  0.2 mg Intravenous q1 min prn Yulissa Pak MD        glucagon injection 0.5 mg  0.5 mg Intravenous Once PRN Yulissa Pak MD        midazolam (VERSED) injection 0.5-2 mg  0.5-2 mg Intravenous Q4 Min PRN Yulissa Pak MD        naloxone (NARCAN) injection 0.2 mg  0.2 mg Intravenous Q2 Min PRN Yulissa Pak MD        Or    naloxone (NARCAN) injection 0.4 mg   0.4 mg Intravenous Q2 Min PRN Yulissa Pak MD        Or    naloxone (NARCAN) injection 0.2 mg  0.2 mg Intramuscular Q2 Min PRN Yulissa Pak MD        Or    naloxone (NARCAN) injection 0.4 mg  0.4 mg Intramuscular Q2 Min PRN Yulissa Pak MD        simethicone (MYLICON) suspension 133 mg  133 mg Oral Once PRN Yulissa Pak MD        sodium chloride (PF) 0.9% PF flush 3 mL  3 mL Intravenous q1 min prn Yulissa Pak MD        sodium chloride 0.9% BOLUS 500 mL  500 mL Intravenous Once PRN Yulissa Pak MD           Patient Active Problem List   Diagnosis    Plantar fasciitis    TMJ (temporomandibular joint syndrome)    Recurrent cold sores    IUD (intrauterine device) in place - 2020 - Mirena    Elevated LFTs    Abnormal Pap smear of cervix        Past Medical History:   Diagnosis Date    Dairy product intolerance     headaches    Gluten-sensitive enteropathy     swelling when consuming gluten    History of cold sores     HPV in female 2008        Past Surgical History:   Procedure Laterality Date     SECTION      3 Cesarian sections    COLPOSCOPY  2009    Colposcopy    TUBAL LIGATION  2011    Tubal Ligation    wisdom teeth[  1996       Social History     Tobacco Use    Smoking status: Never     Passive exposure: Never    Smokeless tobacco: Never   Substance Use Topics    Alcohol use: Not Currently     Comment: history of rashes       Family History   Problem Relation Age of Onset    Diabetes Mother 53        Type II Diabetes    Obesity Mother     Family History Negative Father     Gastrointestinal Disease Maternal Grandmother     Cervical Cancer Maternal Grandmother     Pancreatic Cancer Paternal Uncle 48        Pancrreatic cancer    Allergies Son         Milk and Soy    Cervical Cancer Maternal Aunt     Rheumatoid Arthritis Maternal Aunt 23    Glaucoma Half-Sister     Coronary Artery Disease No family hx of     Cerebrovascular Disease  "No family hx of     Breast Cancer No family hx of     Colon Cancer No family hx of     Thyroid Disease No family hx of        REVIEW OF SYSTEMS:     5 point ROS negative except as noted above in HPI, including Gen., Resp., CV, GI &  system review.      PHYSICAL EXAM:   Ht 1.6 m (5' 3\")   Wt 96.6 kg (213 lb)   BMI 37.73 kg/m   Estimated body mass index is 37.73 kg/m  as calculated from the following:    Height as of this encounter: 1.6 m (5' 3\").    Weight as of this encounter: 96.6 kg (213 lb).   GENERAL APPEARANCE: healthy and alert  MENTAL STATUS: alert  AIRWAY EXAM: Mallampatti Class I (visualization of the soft palate, fauces, uvula, anterior and posterior pillars)  RESP: lungs clear to auscultation - no rales, rhonchi or wheezes  CV: regular rates and rhythm      IMPRESSION   ASA Class 2 - Mild systemic disease        PLAN:     Plan for colonoscopy. We discussed the risks, benefits and alternatives and the patient wished to proceed.    The above has been forwarded to the consulting provider.      Evelyn Pak MD  [facility]  Phone: [phone]  Fax: [phone]  February 25, 2025    "

## 2025-02-26 LAB
PATH REPORT.COMMENTS IMP SPEC: NORMAL
PATH REPORT.COMMENTS IMP SPEC: NORMAL
PATH REPORT.FINAL DX SPEC: NORMAL
PATH REPORT.GROSS SPEC: NORMAL
PATH REPORT.MICROSCOPIC SPEC OTHER STN: NORMAL
PATH REPORT.RELEVANT HX SPEC: NORMAL
PHOTO IMAGE: NORMAL

## (undated) DEVICE — ENDO FORCEP SPIKED SERRATED SHAFT JUMBO 239CM G56998

## (undated) DEVICE — KIT ENDO TURNOVER/PROCEDURE W/CLEAN A SCOPE LINERS 103888

## (undated) RX ORDER — FENTANYL CITRATE 50 UG/ML
INJECTION, SOLUTION INTRAMUSCULAR; INTRAVENOUS
Status: DISPENSED
Start: 2025-02-25

## (undated) RX ORDER — SIMETHICONE 40MG/0.6ML
SUSPENSION, DROPS(FINAL DOSAGE FORM)(ML) ORAL
Status: DISPENSED
Start: 2025-02-25